# Patient Record
Sex: FEMALE | Race: BLACK OR AFRICAN AMERICAN | NOT HISPANIC OR LATINO | Employment: PART TIME | URBAN - METROPOLITAN AREA
[De-identification: names, ages, dates, MRNs, and addresses within clinical notes are randomized per-mention and may not be internally consistent; named-entity substitution may affect disease eponyms.]

---

## 2017-11-10 ENCOUNTER — HOSPITAL ENCOUNTER (EMERGENCY)
Facility: HOSPITAL | Age: 27
Discharge: HOME | End: 2017-11-10
Attending: EMERGENCY MEDICINE
Payer: MEDICAID

## 2017-11-10 VITALS
HEIGHT: 66 IN | SYSTOLIC BLOOD PRESSURE: 144 MMHG | DIASTOLIC BLOOD PRESSURE: 80 MMHG | HEART RATE: 90 BPM | RESPIRATION RATE: 18 BRPM | TEMPERATURE: 98 F | OXYGEN SATURATION: 99 % | BODY MASS INDEX: 27.32 KG/M2 | WEIGHT: 170 LBS

## 2017-11-10 DIAGNOSIS — J06.9 VIRAL URI WITH COUGH: Primary | ICD-10-CM

## 2017-11-10 LAB
B-HCG UR QL: NEGATIVE
CTP QC/QA: YES
FLUAV AG SPEC QL IA: NEGATIVE
FLUBV AG SPEC QL IA: NEGATIVE
SPECIMEN SOURCE: NORMAL

## 2017-11-10 PROCEDURE — 81025 URINE PREGNANCY TEST: CPT | Performed by: EMERGENCY MEDICINE

## 2017-11-10 PROCEDURE — 25000003 PHARM REV CODE 250: Performed by: PHYSICIAN ASSISTANT

## 2017-11-10 PROCEDURE — 87400 INFLUENZA A/B EACH AG IA: CPT | Mod: 59

## 2017-11-10 PROCEDURE — 99284 EMERGENCY DEPT VISIT MOD MDM: CPT | Mod: 25

## 2017-11-10 RX ORDER — FLUTICASONE PROPIONATE 50 MCG
1 SPRAY, SUSPENSION (ML) NASAL 2 TIMES DAILY PRN
Qty: 15 G | Refills: 0 | Status: SHIPPED | OUTPATIENT
Start: 2017-11-10 | End: 2019-05-20

## 2017-11-10 RX ORDER — CETIRIZINE HYDROCHLORIDE, PSEUDOEPHEDRINE HYDROCHLORIDE 5; 120 MG/1; MG/1
1 TABLET, FILM COATED, EXTENDED RELEASE ORAL DAILY
Qty: 30 TABLET | Refills: 0 | Status: SHIPPED | OUTPATIENT
Start: 2017-11-10 | End: 2017-11-20

## 2017-11-10 RX ORDER — KETOROLAC TROMETHAMINE 10 MG/1
10 TABLET, FILM COATED ORAL
Status: COMPLETED | OUTPATIENT
Start: 2017-11-10 | End: 2017-11-10

## 2017-11-10 RX ORDER — BENZONATATE 100 MG/1
100 CAPSULE ORAL 3 TIMES DAILY PRN
Status: DISCONTINUED | OUTPATIENT
Start: 2017-11-10 | End: 2017-11-10

## 2017-11-10 RX ORDER — GUAIFENESIN AND DEXTROMETHORPHAN HYDROBROMIDE 60; 1200 MG/1; MG/1
1 TABLET, EXTENDED RELEASE ORAL 2 TIMES DAILY PRN
Qty: 30 TABLET | Refills: 0 | Status: SHIPPED | OUTPATIENT
Start: 2017-11-10 | End: 2017-11-20

## 2017-11-10 RX ORDER — IBUPROFEN 600 MG/1
600 TABLET ORAL EVERY 6 HOURS PRN
Qty: 20 TABLET | Refills: 0 | Status: SHIPPED | OUTPATIENT
Start: 2017-11-10 | End: 2019-05-20

## 2017-11-10 RX ADMIN — BENZONATATE 100 MG: 100 CAPSULE ORAL at 02:11

## 2017-11-10 RX ADMIN — KETOROLAC TROMETHAMINE 10 MG: 10 TABLET, FILM COATED ORAL at 02:11

## 2017-11-10 NOTE — ED NOTES
Patient has verified the spelling of their name and  on armband  LOC: The patient is awake, alert, and aware of environment with an appropriate affect, the patient is oriented x 3 and speaking appropriately.   APPEARANCE: Patient resting comfortably and in no acute distress, patient is clean and well groomed, patient's clothing is properly fastened.   SKIN: The skin is warm and dry, color consistent with ethnicity, patient has normal skin turgor and moist mucus membranes, skin intact, no breakdown or bruising noted.   : Voids without difficulty  MUSCULOSKELETAL: Patient moving all extremities spontaneously, no obvious swelling or deformities noted, back pain, muscle aches.   RESPIRATORY: Airway is open and patent, respirations are spontaneous, patient has a normal effort and rate, no accessory muscle use noted, bilateral breath sounds clear, denies SOB   ABDOMEN: Soft and non tender to palpation, no distention noted, normoactive bowel sounds present in all four quadrants.   CARDIAC: Normal rate and rhythm, no peripheral edema noted, less then 3 second capillary refill, denies chest pain  COMPLAINT: cough, post nasal drip

## 2017-11-10 NOTE — ED PROVIDER NOTES
Encounter Date: 11/10/2017       History     Chief Complaint   Patient presents with    Influenza     bodyaches withsore throat and cough for 2 days.     Odalis Valdaez 27 y.o. afebrile female with no reported past medical history presented to the ED with c/o URI symptoms for the past 2 days.  She complains of rhinorrhea, nasal congestion, sore throat and dry cough.  She does report that she began with some chills and generalized body aches today.  She denies any difficulty swallowing, shortness of breath, productive cough, chest pain, nausea, vomiting, diarrhea, decreased urine output or rash.  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  She has tried DayQuil with little relief of symptoms.       The history is provided by the patient.     Review of patient's allergies indicates:  No Known Allergies  History reviewed. No pertinent past medical history.  History reviewed. No pertinent surgical history.  History reviewed. No pertinent family history.  Social History   Substance Use Topics    Smoking status: Never Smoker    Smokeless tobacco: Never Used    Alcohol use No     Review of Systems   Constitutional: Positive for appetite change and chills. Negative for fever.   HENT: Positive for congestion, postnasal drip, sinus pressure and sore throat. Negative for trouble swallowing and voice change.    Respiratory: Positive for cough (dry). Negative for shortness of breath and wheezing.    Cardiovascular: Negative for chest pain.   Gastrointestinal: Negative for nausea and vomiting.   Genitourinary: Negative for decreased urine volume and dysuria.   Musculoskeletal: Positive for arthralgias (body aches). Negative for back pain, neck pain and neck stiffness.   Skin: Negative for rash.   Neurological: Negative for dizziness, weakness, light-headedness and headaches.   Hematological: Does not bruise/bleed easily.       Physical Exam      Initial Vitals [11/10/17 1345]   BP Pulse Resp Temp SpO2   (!) 144/80 90 18 98.4 °F (36.9 °C) 99 %      MAP       101.33         Physical Exam    Nursing note and vitals reviewed.  Constitutional: Vital signs are normal. She appears well-developed and well-nourished. She is cooperative.  Non-toxic appearance. She does not appear ill. No distress.   HENT:   Head: Normocephalic and atraumatic.   Nose: Mucosal edema and rhinorrhea present.   Mouth/Throat: No oropharyngeal exudate or posterior oropharyngeal edema.   Eyes: Conjunctivae and lids are normal.   Neck: Neck supple. No neck rigidity.   Cardiovascular: Normal rate and regular rhythm.   Pulmonary/Chest: Breath sounds normal. No respiratory distress. She has no wheezes. She has no rhonchi.   Abdominal: Soft. Normal appearance and bowel sounds are normal. There is no tenderness. There is no rigidity and no guarding.   Musculoskeletal: Normal range of motion.   Neurological: She is alert and oriented to person, place, and time. GCS eye subscore is 4. GCS verbal subscore is 5. GCS motor subscore is 6.   Skin: Skin is warm, dry and intact. No rash noted.   Psychiatric: She has a normal mood and affect. Her speech is normal and behavior is normal. Thought content normal.         ED Course   Procedures  Labs Reviewed   INFLUENZA A AND B ANTIGEN   POCT URINE PREGNANCY   Odalis Rory 27 y.o. afebrile female with no reported past medical history presented to the ED with c/o URI symptoms for the past 2 days.  She complains of rhinorrhea, nasal congestion, sore throat and dry cough.  She does report that she began with some chills and generalized body aches today.  She denies any difficulty swallowing, shortness of breath, productive cough, chest pain, nausea, vomiting, diarrhea, decreased urine output or rash.  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  She has tried DayQuil with little  relief of symptoms.  ROS positive for URI symptoms.  Physical exam reveals patient that appears ill but nontoxic. TM's with bilateral serous otitis media; nose with congestion and rhinorrhea. Oropharynx with mild erythema; no edema or exudate. Lungs clear and free of wheeze. Heart regular rate and rhythm. Abdomen is soft and nontender with normal bowel sounds. FROM of neck, no lymphadenopathy and FROM of all extremities with strength 5/5 bilaterally. Skin free of rash, pallor and diaphoresis.    DDX: influenza, viral URI with cough, bronchitis, pneumonia    ED management: Flu swab negative.  No imaging or lab work warranted at this time as patient is typically well;  afebrile female with no significant clinical exam findings.  Symptoms reduced with Toradol.  We will send home with supportive medications for probable viral URI and informed patient this is likely self-limiting. Instructed patient on fever and symptom control.      Impression/Plan: The encounter diagnosis was Viral URI with cough. Discharged with Zyrtec-D, Mucinex DM, Flonase and Motrin. Patient will follow up with Primary.  Patient cautioned on when to return to ED.  Pt. Understands and agrees with current treatment plan                                                   ED Course      Clinical Impression:   The encounter diagnosis was Viral URI with cough.                           BYRON Waggoner  11/10/17 1060

## 2019-05-20 ENCOUNTER — HOSPITAL ENCOUNTER (EMERGENCY)
Facility: HOSPITAL | Age: 29
Discharge: HOME | End: 2019-05-20
Attending: EMERGENCY MEDICINE
Payer: MEDICAID

## 2019-05-20 VITALS
TEMPERATURE: 99 F | HEART RATE: 98 BPM | OXYGEN SATURATION: 100 % | WEIGHT: 245 LBS | RESPIRATION RATE: 16 BRPM | SYSTOLIC BLOOD PRESSURE: 135 MMHG | HEIGHT: 68 IN | DIASTOLIC BLOOD PRESSURE: 83 MMHG | BODY MASS INDEX: 37.13 KG/M2

## 2019-05-20 DIAGNOSIS — R06.00 DYSPNEA, UNSPECIFIED TYPE: Primary | ICD-10-CM

## 2019-05-20 PROCEDURE — 93010 EKG 12-LEAD: ICD-10-PCS | Mod: ,,, | Performed by: INTERNAL MEDICINE

## 2019-05-20 PROCEDURE — 93005 ELECTROCARDIOGRAM TRACING: CPT

## 2019-05-20 PROCEDURE — 99282 EMERGENCY DEPT VISIT SF MDM: CPT | Mod: 25

## 2019-05-20 PROCEDURE — 93010 ELECTROCARDIOGRAM REPORT: CPT | Mod: ,,, | Performed by: INTERNAL MEDICINE

## 2019-05-20 RX ORDER — SERTRALINE HYDROCHLORIDE 50 MG/1
50 TABLET, FILM COATED ORAL DAILY
COMMUNITY

## 2019-05-20 NOTE — ED PROVIDER NOTES
Encounter Date: 5/20/2019    SCRIBE #1 NOTE: I, Meagan Gramajo, am scribing for, and in the presence of,  Dr. Sawyer. I have scribed the entire note.       History     Chief Complaint   Patient presents with    Shortness of Breath     Pt states she was walking and suddenly felt pulling sensation to left side of face with SOB, SOB has subsided. Pt deneis chest pain.      Time seen by provider: 3:34 PM    This is a 28 y.o. female who presents with complaint of shortness of breath for 60 seconds. She denies any chest pain, tingling sensation, fever, cough, or congestion. Patient reports she was walking when she felt a sudden pulling sensation to the left side of her face when shortness of breath began. She was placed  on Zoloft 50mg two weeks ago. She has no medical history.      The history is provided by the patient.     Review of patient's allergies indicates:  No Known Allergies  History reviewed. No pertinent past medical history.  No past surgical history on file.  No family history on file.  Social History     Tobacco Use    Smoking status: Never Smoker    Smokeless tobacco: Never Used   Substance Use Topics    Alcohol use: No    Drug use: No     Review of Systems   Respiratory: Positive for shortness of breath.    All other systems reviewed and are negative.      Physical Exam     Initial Vitals [05/20/19 1437]   BP Pulse Resp Temp SpO2   135/83 98 16 98.5 °F (36.9 °C) 100 %      MAP       --         Physical Exam    Nursing note and vitals reviewed.  Constitutional: She appears well-developed and well-nourished. She is not diaphoretic. No distress.   HENT:   Head: Normocephalic and atraumatic.   Mouth/Throat: Oropharynx is clear and moist.   Eyes: EOM are normal. Pupils are equal, round, and reactive to light.   Neck: No tracheal deviation present.   Cardiovascular: Normal rate, regular rhythm, normal heart sounds and intact distal pulses.   No murmur heard.  Pulmonary/Chest: Breath sounds normal. No  stridor. No respiratory distress. She has no wheezes.   Abdominal: Soft. Bowel sounds are normal. She exhibits no distension. There is no tenderness.   Musculoskeletal: Normal range of motion.   No lower extremity edema.   Neurological: She is alert and oriented to person, place, and time. She has normal strength. No cranial nerve deficit or sensory deficit.   Skin: Skin is warm and dry. Capillary refill takes less than 2 seconds. No pallor.   Psychiatric: She has a normal mood and affect. Her behavior is normal. Thought content normal.         ED Course   Procedures  Labs Reviewed - No data to display          Medical Decision Making:   ED Management:  28-year-old female who experienced a brief, approximately 60 sec and pulling sensation to the left side of her face and says she felt short of breath. Symptoms have not returned since initial onset.  She has a normal EKG and a normal physical exam.  Vital signs are normal and I do not see the current need for any further testing.  Patient may have had a facial muscle spasm.  She will follow up as needed but also return here for any recurring shortness of breath or any other concerns.                      Clinical Impression:       ICD-10-CM ICD-9-CM   1. Dyspnea, unspecified type R06.00 786.09            I, Dr. Sergei Sawyer, personally performed the services described in this documentation. All medical record entries made by the scribe were at my direction and in my presence. I have reviewed the chart and agree that the record reflects my personal performance and is accurate and complete. Sergei Sawyer MD.  6:10 PM 05/20/2019                   Sergei Saywer MD  05/20/19 5245

## 2019-05-24 DIAGNOSIS — R06.00 DYSPNEA, UNSPECIFIED TYPE: Primary | ICD-10-CM

## 2020-07-31 ENCOUNTER — LAB VISIT (OUTPATIENT)
Dept: LAB | Facility: OTHER | Age: 30
End: 2020-07-31
Payer: MEDICAID

## 2020-07-31 DIAGNOSIS — Z20.822 SUSPECTED COVID-19 VIRUS INFECTION: ICD-10-CM

## 2020-07-31 DIAGNOSIS — Z03.818 ENCOUNTER FOR OBSERVATION FOR SUSPECTED EXPOSURE TO OTHER BIOLOGICAL AGENTS RULED OUT: ICD-10-CM

## 2020-07-31 PROCEDURE — U0003 INFECTIOUS AGENT DETECTION BY NUCLEIC ACID (DNA OR RNA); SEVERE ACUTE RESPIRATORY SYNDROME CORONAVIRUS 2 (SARS-COV-2) (CORONAVIRUS DISEASE [COVID-19]), AMPLIFIED PROBE TECHNIQUE, MAKING USE OF HIGH THROUGHPUT TECHNOLOGIES AS DESCRIBED BY CMS-2020-01-R: HCPCS

## 2020-08-02 LAB — SARS-COV-2 RNA RESP QL NAA+PROBE: NOT DETECTED

## 2020-12-04 DIAGNOSIS — Z01.84 ANTIBODY RESPONSE EXAMINATION: ICD-10-CM

## 2020-12-30 ENCOUNTER — HOSPITAL ENCOUNTER (EMERGENCY)
Facility: HOSPITAL | Age: 30
Discharge: HOME | End: 2020-12-30
Attending: EMERGENCY MEDICINE
Payer: MEDICAID

## 2020-12-30 VITALS
SYSTOLIC BLOOD PRESSURE: 138 MMHG | WEIGHT: 267 LBS | OXYGEN SATURATION: 100 % | HEIGHT: 67 IN | RESPIRATION RATE: 16 BRPM | DIASTOLIC BLOOD PRESSURE: 77 MMHG | HEART RATE: 66 BPM | TEMPERATURE: 99 F | BODY MASS INDEX: 41.91 KG/M2

## 2020-12-30 DIAGNOSIS — Z20.822 COVID-19 VIRUS NOT DETECTED: Primary | ICD-10-CM

## 2020-12-30 DIAGNOSIS — J02.9 VIRAL PHARYNGITIS: ICD-10-CM

## 2020-12-30 DIAGNOSIS — B34.9 VIRAL SYNDROME: ICD-10-CM

## 2020-12-30 DIAGNOSIS — Z20.822 EXPOSURE TO COVID-19 VIRUS: ICD-10-CM

## 2020-12-30 LAB
CTP QC/QA: YES
SARS-COV-2 RDRP RESP QL NAA+PROBE: NEGATIVE

## 2020-12-30 PROCEDURE — U0002 COVID-19 LAB TEST NON-CDC: HCPCS | Performed by: PHYSICIAN ASSISTANT

## 2020-12-30 PROCEDURE — 99282 EMERGENCY DEPT VISIT SF MDM: CPT | Mod: 25

## 2021-01-10 ENCOUNTER — HOSPITAL ENCOUNTER (EMERGENCY)
Facility: HOSPITAL | Age: 31
Discharge: HOME | End: 2021-01-10
Attending: EMERGENCY MEDICINE
Payer: MEDICAID

## 2021-01-10 VITALS
OXYGEN SATURATION: 97 % | DIASTOLIC BLOOD PRESSURE: 85 MMHG | BODY MASS INDEX: 41.91 KG/M2 | WEIGHT: 267 LBS | TEMPERATURE: 99 F | SYSTOLIC BLOOD PRESSURE: 140 MMHG | HEIGHT: 67 IN | RESPIRATION RATE: 18 BRPM | HEART RATE: 86 BPM

## 2021-01-10 DIAGNOSIS — Z20.822 COVID-19 VIRUS NOT DETECTED: Primary | ICD-10-CM

## 2021-01-10 LAB
CTP QC/QA: YES
SARS-COV-2 RDRP RESP QL NAA+PROBE: NEGATIVE

## 2021-01-10 PROCEDURE — 99282 EMERGENCY DEPT VISIT SF MDM: CPT

## 2021-01-10 PROCEDURE — U0002 COVID-19 LAB TEST NON-CDC: HCPCS | Performed by: EMERGENCY MEDICINE

## 2021-03-29 ENCOUNTER — OFFICE VISIT (OUTPATIENT)
Dept: URGENT CARE | Facility: CLINIC | Age: 31
End: 2021-03-29
Payer: COMMERCIAL

## 2021-03-29 VITALS
BODY MASS INDEX: 39.4 KG/M2 | DIASTOLIC BLOOD PRESSURE: 73 MMHG | WEIGHT: 260 LBS | HEIGHT: 68 IN | TEMPERATURE: 98 F | SYSTOLIC BLOOD PRESSURE: 126 MMHG | RESPIRATION RATE: 16 BRPM | HEART RATE: 99 BPM

## 2021-03-29 DIAGNOSIS — Z02.83 ENCOUNTER FOR DRUG SCREENING: ICD-10-CM

## 2021-03-29 DIAGNOSIS — M62.830 BACK SPASM: ICD-10-CM
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
## 2021-03-29 DIAGNOSIS — M54.50 LOW BACK PAIN, UNSPECIFIED BACK PAIN LATERALITY, UNSPECIFIED CHRONICITY, UNSPECIFIED WHETHER SCIATICA PRESENT: ICD-10-CM

## 2021-03-29 DIAGNOSIS — S39.012A STRAIN OF LUMBAR REGION, INITIAL ENCOUNTER: Primary | ICD-10-CM

## 2021-03-29 LAB
CTP QC/QA: YES
POC 10 PANEL DRUG SCREEN: NEGATIVE

## 2021-03-29 PROCEDURE — 72110 XR LUMBAR SPINE COMPLETE 5 VIEW: ICD-10-PCS | Mod: FY,S$GLB,, | Performed by: RADIOLOGY

## 2021-03-29 PROCEDURE — 80305 DRUG TEST PRSMV DIR OPT OBS: CPT | Mod: QW,S$GLB,, | Performed by: NURSE PRACTITIONER

## 2021-03-29 PROCEDURE — 99204 PR OFFICE/OUTPT VISIT, NEW, LEVL IV, 45-59 MIN: ICD-10-PCS | Mod: S$GLB,,, | Performed by: NURSE PRACTITIONER

## 2021-03-29 PROCEDURE — 72110 X-RAY EXAM L-2 SPINE 4/>VWS: CPT | Mod: FY,S$GLB,, | Performed by: RADIOLOGY

## 2021-03-29 PROCEDURE — 80305 POCT RAPID DRUG SCREEN 10 PANEL: ICD-10-PCS | Mod: QW,S$GLB,, | Performed by: NURSE PRACTITIONER

## 2021-03-29 PROCEDURE — 99204 OFFICE O/P NEW MOD 45 MIN: CPT | Mod: S$GLB,,, | Performed by: NURSE PRACTITIONER

## 2021-03-29 RX ORDER — NAPROXEN 500 MG/1
500 TABLET ORAL 2 TIMES DAILY WITH MEALS
Qty: 30 TABLET | Refills: 0 | Status: SHIPPED | OUTPATIENT
Start: 2021-03-29 | End: 2021-04-19 | Stop reason: SDUPTHER

## 2021-03-29 RX ORDER — TIZANIDINE 4 MG/1
4 TABLET ORAL NIGHTLY PRN
Qty: 30 TABLET | Refills: 0 | Status: SHIPPED | OUTPATIENT
Start: 2021-03-29 | End: 2021-04-19 | Stop reason: SDUPTHER

## 2021-04-07 ENCOUNTER — CLINICAL SUPPORT (OUTPATIENT)
Dept: REHABILITATION | Facility: HOSPITAL | Age: 31
End: 2021-04-07
Payer: COMMERCIAL

## 2021-04-07 DIAGNOSIS — M54.50 ACUTE BILATERAL LOW BACK PAIN WITHOUT SCIATICA: ICD-10-CM

## 2021-04-07 DIAGNOSIS — M25.552 LEFT HIP PAIN: ICD-10-CM

## 2021-04-07 PROCEDURE — 97110 THERAPEUTIC EXERCISES: CPT | Mod: PO

## 2021-04-07 PROCEDURE — 97161 PT EVAL LOW COMPLEX 20 MIN: CPT | Mod: PO

## 2021-04-08 ENCOUNTER — OFFICE VISIT (OUTPATIENT)
Dept: URGENT CARE | Facility: CLINIC | Age: 31
End: 2021-04-08
Payer: COMMERCIAL
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
## 2021-04-08 DIAGNOSIS — M62.830 BACK SPASM: ICD-10-CM

## 2021-04-08 DIAGNOSIS — M54.50 LOW BACK PAIN, UNSPECIFIED BACK PAIN LATERALITY, UNSPECIFIED CHRONICITY, UNSPECIFIED WHETHER SCIATICA PRESENT: ICD-10-CM

## 2021-04-08 DIAGNOSIS — S39.012D STRAIN OF LUMBAR REGION, SUBSEQUENT ENCOUNTER: Primary | ICD-10-CM

## 2021-04-08 PROBLEM — M25.552 LEFT HIP PAIN: Status: ACTIVE | Noted: 2021-04-08

## 2021-04-08 PROCEDURE — 99214 OFFICE O/P EST MOD 30 MIN: CPT | Mod: S$GLB,,, | Performed by: NURSE PRACTITIONER

## 2021-04-08 PROCEDURE — 99214 PR OFFICE/OUTPT VISIT, EST, LEVL IV, 30-39 MIN: ICD-10-PCS | Mod: S$GLB,,, | Performed by: NURSE PRACTITIONER

## 2021-04-19 ENCOUNTER — OFFICE VISIT (OUTPATIENT)
Dept: URGENT CARE | Facility: CLINIC | Age: 31
End: 2021-04-19
Payer: COMMERCIAL

## 2021-04-19 ENCOUNTER — TELEPHONE (OUTPATIENT)
Dept: REHABILITATION | Facility: HOSPITAL | Age: 31
End: 2021-04-19

## 2021-04-19 DIAGNOSIS — M62.830 BACK SPASM: ICD-10-CM

## 2021-04-19 DIAGNOSIS — S39.012D STRAIN OF LUMBAR REGION, SUBSEQUENT ENCOUNTER: Primary | ICD-10-CM

## 2021-04-19 DIAGNOSIS — M54.50 LOW BACK PAIN, UNSPECIFIED BACK PAIN LATERALITY, UNSPECIFIED CHRONICITY, UNSPECIFIED WHETHER SCIATICA PRESENT: ICD-10-CM

## 2021-04-19 DIAGNOSIS — S39.012A STRAIN OF LUMBAR REGION, INITIAL ENCOUNTER: ICD-10-CM

## 2021-04-19 PROCEDURE — 99214 OFFICE O/P EST MOD 30 MIN: CPT | Mod: S$GLB,,, | Performed by: PREVENTIVE MEDICINE

## 2021-04-19 PROCEDURE — 99214 PR OFFICE/OUTPT VISIT, EST, LEVL IV, 30-39 MIN: ICD-10-PCS | Mod: S$GLB,,, | Performed by: PREVENTIVE MEDICINE

## 2021-04-19 RX ORDER — NAPROXEN 500 MG/1
500 TABLET ORAL 2 TIMES DAILY WITH MEALS
Qty: 30 TABLET | Refills: 0 | Status: SHIPPED | OUTPATIENT
Start: 2021-04-19 | End: 2022-11-01

## 2021-04-19 RX ORDER — TIZANIDINE 4 MG/1
4 TABLET ORAL NIGHTLY PRN
Qty: 30 TABLET | Refills: 0 | Status: SHIPPED | OUTPATIENT
Start: 2021-04-19

## 2021-04-20 ENCOUNTER — CLINICAL SUPPORT (OUTPATIENT)
Dept: REHABILITATION | Facility: HOSPITAL | Age: 31
End: 2021-04-20
Payer: COMMERCIAL

## 2021-04-20 DIAGNOSIS — M54.50 ACUTE BILATERAL LOW BACK PAIN WITHOUT SCIATICA: ICD-10-CM

## 2021-04-20 DIAGNOSIS — M25.552 LEFT HIP PAIN: ICD-10-CM

## 2021-04-20 PROCEDURE — 97110 THERAPEUTIC EXERCISES: CPT | Mod: PO

## 2021-04-26 ENCOUNTER — PATIENT MESSAGE (OUTPATIENT)
Dept: RESEARCH | Facility: HOSPITAL | Age: 31
End: 2021-04-26

## 2021-04-28 ENCOUNTER — CLINICAL SUPPORT (OUTPATIENT)
Dept: REHABILITATION | Facility: HOSPITAL | Age: 31
End: 2021-04-28
Payer: COMMERCIAL

## 2021-04-28 DIAGNOSIS — M25.552 LEFT HIP PAIN: ICD-10-CM

## 2021-04-28 DIAGNOSIS — M54.50 ACUTE BILATERAL LOW BACK PAIN WITHOUT SCIATICA: ICD-10-CM

## 2021-04-28 PROCEDURE — 97110 THERAPEUTIC EXERCISES: CPT | Mod: PO,CQ

## 2021-05-03 ENCOUNTER — OFFICE VISIT (OUTPATIENT)
Dept: URGENT CARE | Facility: CLINIC | Age: 31
End: 2021-05-03
Payer: COMMERCIAL

## 2021-05-03 DIAGNOSIS — M54.50 LOW BACK PAIN, UNSPECIFIED BACK PAIN LATERALITY, UNSPECIFIED CHRONICITY, UNSPECIFIED WHETHER SCIATICA PRESENT: ICD-10-CM
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
## 2021-05-03 DIAGNOSIS — S39.012D STRAIN OF LUMBAR REGION, SUBSEQUENT ENCOUNTER: Primary | ICD-10-CM

## 2021-05-03 DIAGNOSIS — M62.830 BACK SPASM: ICD-10-CM

## 2021-05-03 PROCEDURE — 99214 PR OFFICE/OUTPT VISIT, EST, LEVL IV, 30-39 MIN: ICD-10-PCS | Mod: S$GLB,,, | Performed by: NURSE PRACTITIONER

## 2021-05-03 PROCEDURE — 99214 OFFICE O/P EST MOD 30 MIN: CPT | Mod: S$GLB,,, | Performed by: NURSE PRACTITIONER

## 2021-05-04 ENCOUNTER — DOCUMENTATION ONLY (OUTPATIENT)
Dept: REHABILITATION | Facility: HOSPITAL | Age: 31
End: 2021-05-04

## 2021-05-12 ENCOUNTER — CLINICAL SUPPORT (OUTPATIENT)
Dept: REHABILITATION | Facility: HOSPITAL | Age: 31
End: 2021-05-12
Payer: MEDICAID

## 2021-05-12 DIAGNOSIS — M25.552 LEFT HIP PAIN: ICD-10-CM

## 2021-05-12 DIAGNOSIS — M54.50 ACUTE BILATERAL LOW BACK PAIN WITHOUT SCIATICA: ICD-10-CM

## 2021-05-12 PROCEDURE — 97110 THERAPEUTIC EXERCISES: CPT | Mod: PO,CQ

## 2021-05-15 ENCOUNTER — HOSPITAL ENCOUNTER (EMERGENCY)
Facility: HOSPITAL | Age: 31
Discharge: HOME | End: 2021-05-15
Attending: EMERGENCY MEDICINE
Payer: MEDICAID

## 2021-05-15 VITALS
HEIGHT: 67 IN | TEMPERATURE: 99 F | BODY MASS INDEX: 38.45 KG/M2 | WEIGHT: 245 LBS | SYSTOLIC BLOOD PRESSURE: 137 MMHG | OXYGEN SATURATION: 99 % | RESPIRATION RATE: 16 BRPM | DIASTOLIC BLOOD PRESSURE: 76 MMHG | HEART RATE: 110 BPM

## 2021-05-15 DIAGNOSIS — S39.012A STRAIN OF LUMBAR REGION, INITIAL ENCOUNTER: Primary | ICD-10-CM

## 2021-05-15 LAB
B-HCG UR QL: NEGATIVE
CTP QC/QA: YES

## 2021-05-15 PROCEDURE — 25000003 PHARM REV CODE 250: Performed by: EMERGENCY MEDICINE

## 2021-05-15 PROCEDURE — 99284 EMERGENCY DEPT VISIT MOD MDM: CPT | Mod: 25

## 2021-05-15 PROCEDURE — 96372 THER/PROPH/DIAG INJ SC/IM: CPT

## 2021-05-15 PROCEDURE — 63600175 PHARM REV CODE 636 W HCPCS: Performed by: PHYSICIAN ASSISTANT

## 2021-05-15 PROCEDURE — 81025 URINE PREGNANCY TEST: CPT | Performed by: PHYSICIAN ASSISTANT

## 2021-05-15 RX ORDER — LIDOCAINE 50 MG/G
2 PATCH TOPICAL ONCE
Status: DISCONTINUED | OUTPATIENT
Start: 2021-05-15 | End: 2021-05-15 | Stop reason: HOSPADM

## 2021-05-15 RX ORDER — LIDOCAINE 50 MG/G
1 PATCH TOPICAL DAILY
Qty: 15 PATCH | Refills: 0 | Status: SHIPPED | OUTPATIENT
Start: 2021-05-15

## 2021-05-15 RX ORDER — TRIAMCINOLONE ACETONIDE 40 MG/ML
40 INJECTION, SUSPENSION INTRA-ARTICULAR; INTRAMUSCULAR
Status: COMPLETED | OUTPATIENT
Start: 2021-05-15 | End: 2021-05-15

## 2021-05-15 RX ORDER — LIDOCAINE 50 MG/G
2 PATCH TOPICAL ONCE
Status: DISCONTINUED | OUTPATIENT
Start: 2021-05-15 | End: 2021-05-15

## 2021-05-15 RX ORDER — LIDOCAINE 50 MG/G
2 PATCH TOPICAL
Status: DISCONTINUED | OUTPATIENT
Start: 2021-05-15 | End: 2021-05-15

## 2021-05-15 RX ORDER — KETOROLAC TROMETHAMINE 30 MG/ML
30 INJECTION, SOLUTION INTRAMUSCULAR; INTRAVENOUS
Status: COMPLETED | OUTPATIENT
Start: 2021-05-15 | End: 2021-05-15

## 2021-05-15 RX ADMIN — LIDOCAINE 2 PATCH: 50 PATCH TOPICAL at 05:05

## 2021-05-15 RX ADMIN — KETOROLAC TROMETHAMINE 30 MG: 30 INJECTION, SOLUTION INTRAMUSCULAR; INTRAVENOUS at 05:05

## 2021-05-15 RX ADMIN — TRIAMCINOLONE ACETONIDE 40 MG: 40 INJECTION, SUSPENSION INTRA-ARTICULAR; INTRAMUSCULAR at 05:05

## 2021-05-17 ENCOUNTER — OFFICE VISIT (OUTPATIENT)
Dept: URGENT CARE | Facility: CLINIC | Age: 31
End: 2021-05-17
Payer: COMMERCIAL

## 2021-05-17 DIAGNOSIS — M54.50 LOW BACK PAIN, UNSPECIFIED BACK PAIN LATERALITY, UNSPECIFIED CHRONICITY, UNSPECIFIED WHETHER SCIATICA PRESENT: ICD-10-CM

## 2021-05-17 DIAGNOSIS — S39.012D STRAIN OF LUMBAR REGION, SUBSEQUENT ENCOUNTER: Primary | ICD-10-CM

## 2021-05-17 DIAGNOSIS — M62.830 BACK SPASM: ICD-10-CM
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
## 2021-05-17 PROCEDURE — 99213 OFFICE O/P EST LOW 20 MIN: CPT | Mod: S$GLB,,, | Performed by: NURSE PRACTITIONER

## 2021-05-17 PROCEDURE — 99213 PR OFFICE/OUTPT VISIT, EST, LEVL III, 20-29 MIN: ICD-10-PCS | Mod: S$GLB,,, | Performed by: NURSE PRACTITIONER

## 2021-05-18 ENCOUNTER — CLINICAL SUPPORT (OUTPATIENT)
Dept: REHABILITATION | Facility: HOSPITAL | Age: 31
End: 2021-05-18
Payer: MEDICAID

## 2021-05-18 DIAGNOSIS — M25.552 LEFT HIP PAIN: ICD-10-CM

## 2021-05-18 DIAGNOSIS — M54.50 ACUTE BILATERAL LOW BACK PAIN WITHOUT SCIATICA: ICD-10-CM

## 2021-05-18 PROCEDURE — 97110 THERAPEUTIC EXERCISES: CPT | Mod: PO

## 2021-05-20 ENCOUNTER — CLINICAL SUPPORT (OUTPATIENT)
Dept: REHABILITATION | Facility: HOSPITAL | Age: 31
End: 2021-05-20
Payer: MEDICAID

## 2021-05-20 DIAGNOSIS — M25.552 LEFT HIP PAIN: ICD-10-CM

## 2021-05-20 DIAGNOSIS — M54.50 ACUTE BILATERAL LOW BACK PAIN WITHOUT SCIATICA: ICD-10-CM

## 2021-05-20 PROCEDURE — 97110 THERAPEUTIC EXERCISES: CPT | Mod: PO

## 2021-05-20 PROCEDURE — 97750 PHYSICAL PERFORMANCE TEST: CPT | Mod: PO

## 2021-05-25 ENCOUNTER — CLINICAL SUPPORT (OUTPATIENT)
Dept: REHABILITATION | Facility: HOSPITAL | Age: 31
End: 2021-05-25
Payer: MEDICAID

## 2021-05-25 DIAGNOSIS — M25.552 LEFT HIP PAIN: ICD-10-CM

## 2021-05-25 DIAGNOSIS — M54.50 ACUTE BILATERAL LOW BACK PAIN WITHOUT SCIATICA: ICD-10-CM

## 2021-05-25 PROCEDURE — 97110 THERAPEUTIC EXERCISES: CPT | Mod: PO

## 2021-05-26 ENCOUNTER — OFFICE VISIT (OUTPATIENT)
Dept: URGENT CARE | Facility: CLINIC | Age: 31
End: 2021-05-26
Payer: COMMERCIAL

## 2021-05-26 DIAGNOSIS — S39.012D STRAIN OF LUMBAR REGION, SUBSEQUENT ENCOUNTER: Primary | ICD-10-CM

## 2021-05-26 DIAGNOSIS — M54.50 LOW BACK PAIN, UNSPECIFIED BACK PAIN LATERALITY, UNSPECIFIED CHRONICITY, UNSPECIFIED WHETHER SCIATICA PRESENT: ICD-10-CM
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
## 2021-05-26 PROCEDURE — 99213 PR OFFICE/OUTPT VISIT, EST, LEVL III, 20-29 MIN: ICD-10-PCS | Mod: S$GLB,,, | Performed by: NURSE PRACTITIONER

## 2021-05-26 PROCEDURE — 99213 OFFICE O/P EST LOW 20 MIN: CPT | Mod: S$GLB,,, | Performed by: NURSE PRACTITIONER

## 2021-06-01 ENCOUNTER — CLINICAL SUPPORT (OUTPATIENT)
Dept: REHABILITATION | Facility: HOSPITAL | Age: 31
End: 2021-06-01
Payer: MEDICAID

## 2021-06-01 DIAGNOSIS — M25.552 LEFT HIP PAIN: ICD-10-CM

## 2021-06-01 DIAGNOSIS — M54.50 ACUTE BILATERAL LOW BACK PAIN WITHOUT SCIATICA: ICD-10-CM

## 2021-06-01 PROCEDURE — 97530 THERAPEUTIC ACTIVITIES: CPT | Mod: PO

## 2021-06-03 ENCOUNTER — OFFICE VISIT (OUTPATIENT)
Dept: URGENT CARE | Facility: CLINIC | Age: 31
End: 2021-06-03
Payer: COMMERCIAL

## 2021-06-03 ENCOUNTER — OCCUPATIONAL HEALTH (OUTPATIENT)
Dept: URGENT CARE | Facility: CLINIC | Age: 31
End: 2021-06-03

## 2021-06-03 DIAGNOSIS — Z02.1 ENCOUNTER FOR PRE-EMPLOYMENT EXAMINATION: Primary | ICD-10-CM

## 2021-06-03 DIAGNOSIS — Z02.1 PRE-EMPLOYMENT EXAMINATION: ICD-10-CM

## 2021-06-03 DIAGNOSIS — S39.012D STRAIN OF LUMBAR REGION, SUBSEQUENT ENCOUNTER: Primary | ICD-10-CM

## 2021-06-03 DIAGNOSIS — M54.50 LOW BACK PAIN, UNSPECIFIED BACK PAIN LATERALITY, UNSPECIFIED CHRONICITY, UNSPECIFIED WHETHER SCIATICA PRESENT: ICD-10-CM
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
## 2021-06-03 LAB
CTP QC/QA: YES
POC 10 PANEL DRUG SCREEN: NEGATIVE

## 2021-06-03 PROCEDURE — 99499 PHYSICAL, BASIC COMPLEXITY: ICD-10-PCS | Mod: S$GLB,,, | Performed by: EMERGENCY MEDICINE

## 2021-06-03 PROCEDURE — 80305 POCT RAPID DRUG SCREEN 10 PANEL: ICD-10-PCS | Mod: S$GLB,,, | Performed by: EMERGENCY MEDICINE

## 2021-06-03 PROCEDURE — 99213 PR OFFICE/OUTPT VISIT, EST, LEVL III, 20-29 MIN: ICD-10-PCS | Mod: S$GLB,,, | Performed by: NURSE PRACTITIONER

## 2021-06-03 PROCEDURE — 99499 UNLISTED E&M SERVICE: CPT | Mod: S$GLB,,, | Performed by: EMERGENCY MEDICINE

## 2021-06-03 PROCEDURE — 80305 DRUG TEST PRSMV DIR OPT OBS: CPT | Mod: S$GLB,,, | Performed by: EMERGENCY MEDICINE

## 2021-06-03 PROCEDURE — 99213 OFFICE O/P EST LOW 20 MIN: CPT | Mod: S$GLB,,, | Performed by: NURSE PRACTITIONER

## 2021-12-19 ENCOUNTER — HOSPITAL ENCOUNTER (EMERGENCY)
Facility: HOSPITAL | Age: 31
Discharge: HOME | End: 2021-12-19
Attending: EMERGENCY MEDICINE
Payer: MEDICAID

## 2021-12-19 VITALS
WEIGHT: 265 LBS | HEART RATE: 87 BPM | RESPIRATION RATE: 18 BRPM | BODY MASS INDEX: 41.5 KG/M2 | DIASTOLIC BLOOD PRESSURE: 76 MMHG | OXYGEN SATURATION: 100 % | SYSTOLIC BLOOD PRESSURE: 120 MMHG | TEMPERATURE: 99 F

## 2021-12-19 DIAGNOSIS — U07.1 COVID-19: Primary | ICD-10-CM

## 2021-12-19 LAB
B-HCG UR QL: NEGATIVE
CTP QC/QA: YES
CTP QC/QA: YES
SARS-COV-2 RDRP RESP QL NAA+PROBE: POSITIVE

## 2021-12-19 PROCEDURE — 81025 URINE PREGNANCY TEST: CPT | Performed by: PHYSICIAN ASSISTANT

## 2021-12-19 PROCEDURE — M0243 CASIRIVI AND IMDEVI INFUSION: HCPCS | Performed by: PHYSICIAN ASSISTANT

## 2021-12-19 PROCEDURE — 63600175 PHARM REV CODE 636 W HCPCS: Performed by: PHYSICIAN ASSISTANT

## 2021-12-19 PROCEDURE — 99284 EMERGENCY DEPT VISIT MOD MDM: CPT | Mod: 25

## 2021-12-19 PROCEDURE — 25000003 PHARM REV CODE 250: Performed by: PHYSICIAN ASSISTANT

## 2021-12-19 PROCEDURE — U0002 COVID-19 LAB TEST NON-CDC: HCPCS | Performed by: EMERGENCY MEDICINE

## 2021-12-19 PROCEDURE — 25000003 PHARM REV CODE 250: Performed by: EMERGENCY MEDICINE

## 2021-12-19 RX ORDER — ONDANSETRON 4 MG/1
4 TABLET, ORALLY DISINTEGRATING ORAL ONCE AS NEEDED
Status: DISCONTINUED | OUTPATIENT
Start: 2021-12-19 | End: 2021-12-20 | Stop reason: HOSPADM

## 2021-12-19 RX ORDER — ALBUTEROL SULFATE 90 UG/1
2 AEROSOL, METERED RESPIRATORY (INHALATION)
Status: DISCONTINUED | OUTPATIENT
Start: 2021-12-19 | End: 2021-12-20 | Stop reason: HOSPADM

## 2021-12-19 RX ORDER — ACETAMINOPHEN 500 MG
1000 TABLET ORAL
Status: COMPLETED | OUTPATIENT
Start: 2021-12-19 | End: 2021-12-19

## 2021-12-19 RX ORDER — DIPHENHYDRAMINE HYDROCHLORIDE 50 MG/ML
25 INJECTION INTRAMUSCULAR; INTRAVENOUS ONCE AS NEEDED
Status: DISCONTINUED | OUTPATIENT
Start: 2021-12-19 | End: 2021-12-20 | Stop reason: HOSPADM

## 2021-12-19 RX ORDER — SODIUM CHLORIDE 0.9 % (FLUSH) 0.9 %
10 SYRINGE (ML) INJECTION
Status: DISCONTINUED | OUTPATIENT
Start: 2021-12-19 | End: 2021-12-20 | Stop reason: HOSPADM

## 2021-12-19 RX ORDER — BENZONATATE 100 MG/1
100 CAPSULE ORAL 3 TIMES DAILY PRN
Qty: 10 CAPSULE | Refills: 0 | Status: SHIPPED | OUTPATIENT
Start: 2021-12-19 | End: 2021-12-29

## 2021-12-19 RX ORDER — EPINEPHRINE 0.3 MG/.3ML
0.3 INJECTION SUBCUTANEOUS
Status: DISCONTINUED | OUTPATIENT
Start: 2021-12-19 | End: 2021-12-20 | Stop reason: HOSPADM

## 2021-12-19 RX ADMIN — CASIRIVIMAB AND IMDEVIMAB 600 MG: 600; 600 INJECTION, SOLUTION, CONCENTRATE INTRAVENOUS at 05:12

## 2021-12-19 RX ADMIN — ACETAMINOPHEN 1000 MG: 500 TABLET ORAL at 04:12

## 2022-03-22 ENCOUNTER — OCCUPATIONAL HEALTH (OUTPATIENT)
Dept: URGENT CARE | Facility: CLINIC | Age: 32
End: 2022-03-22

## 2022-03-22 DIAGNOSIS — Z11.1 PPD SCREENING TEST: Primary | ICD-10-CM

## 2022-03-22 DIAGNOSIS — Z02.83 ENCOUNTER FOR DRUG SCREENING: ICD-10-CM

## 2022-03-22 LAB
CTP QC/QA: YES
POC 5 PANEL DRUG SCREEN: NEGATIVE

## 2022-03-22 PROCEDURE — 86580 TB INTRADERMAL TEST: CPT | Mod: S$GLB,,, | Performed by: FAMILY MEDICINE

## 2022-03-22 PROCEDURE — 86580 POCT TB SKIN TEST: ICD-10-PCS | Mod: S$GLB,,, | Performed by: FAMILY MEDICINE

## 2022-03-22 PROCEDURE — 80305 DRUG TEST PRSMV DIR OPT OBS: CPT | Mod: S$GLB,,, | Performed by: FAMILY MEDICINE

## 2022-03-22 PROCEDURE — 80305 POCT RAPID DRUG SCREEN 5 PANEL: ICD-10-PCS | Mod: S$GLB,,, | Performed by: FAMILY MEDICINE

## 2022-10-13 ENCOUNTER — OFFICE VISIT (OUTPATIENT)
Dept: URGENT CARE | Facility: CLINIC | Age: 32
End: 2022-10-13
Payer: COMMERCIAL

## 2022-10-13 ENCOUNTER — HOSPITAL ENCOUNTER (EMERGENCY)
Facility: HOSPITAL | Age: 32
Discharge: HOME | End: 2022-10-13
Attending: EMERGENCY MEDICINE
Payer: COMMERCIAL

## 2022-10-13 VITALS
DIASTOLIC BLOOD PRESSURE: 74 MMHG | SYSTOLIC BLOOD PRESSURE: 116 MMHG | OXYGEN SATURATION: 98 % | HEART RATE: 91 BPM | WEIGHT: 265 LBS | HEIGHT: 67 IN | BODY MASS INDEX: 41.59 KG/M2 | TEMPERATURE: 97 F | RESPIRATION RATE: 18 BRPM

## 2022-10-13 VITALS
HEART RATE: 87 BPM | OXYGEN SATURATION: 99 % | SYSTOLIC BLOOD PRESSURE: 136 MMHG | TEMPERATURE: 98 F | DIASTOLIC BLOOD PRESSURE: 77 MMHG | RESPIRATION RATE: 20 BRPM

## 2022-10-13 DIAGNOSIS — S69.92XA INJURY OF LEFT WRIST, INITIAL ENCOUNTER: ICD-10-CM

## 2022-10-13 DIAGNOSIS — S63.502A SPRAIN OF LEFT WRIST, INITIAL ENCOUNTER: Primary | ICD-10-CM

## 2022-10-13 DIAGNOSIS — S66.912A WRIST STRAIN, LEFT, INITIAL ENCOUNTER: Primary | ICD-10-CM

## 2022-10-13 DIAGNOSIS — Z02.6 ENCOUNTER RELATED TO WORKER'S COMPENSATION CLAIM: ICD-10-CM

## 2022-10-13 LAB
B-HCG UR QL: NEGATIVE
CTP QC/QA: YES

## 2022-10-13 PROCEDURE — 80305 OOH COLLECTION ONLY DRUG SCREEN: ICD-10-PCS | Mod: S$GLB,,,

## 2022-10-13 PROCEDURE — 99203 OFFICE O/P NEW LOW 30 MIN: CPT | Mod: S$GLB,,,

## 2022-10-13 PROCEDURE — 81025 URINE PREGNANCY TEST: CPT | Performed by: PHYSICIAN ASSISTANT

## 2022-10-13 PROCEDURE — 73110 X-RAY EXAM OF WRIST: CPT | Mod: FY,LT,S$GLB, | Performed by: RADIOLOGY

## 2022-10-13 PROCEDURE — 73110 XR WRIST COMPLETE 3 VIEWS LEFT: ICD-10-PCS | Mod: FY,LT,S$GLB, | Performed by: RADIOLOGY

## 2022-10-13 PROCEDURE — 99282 EMERGENCY DEPT VISIT SF MDM: CPT

## 2022-10-13 PROCEDURE — 80305 DRUG TEST PRSMV DIR OPT OBS: CPT | Mod: S$GLB,,,

## 2022-10-13 PROCEDURE — 99203 PR OFFICE/OUTPT VISIT, NEW, LEVL III, 30-44 MIN: ICD-10-PCS | Mod: S$GLB,,,

## 2022-10-13 NOTE — DISCHARGE INSTRUCTIONS
[facility]   [address]   [place] [address]   Phone [phone]   Fax [phone]        Thank you for letting myself and our team take care for you today! It was nice meeting you, and I hope you feel better very soon. Please come back to [facility] for all of your future medical needs.   Our goal in the ER is to always give you outstanding care and exceptional service. You may receive a survey by mail or email in the next week about your experience in our ED. We would greatly appreciate you completing and returning the survey. Your feedback provides us with a way to recognize our staff who give very good care and it helps us learn how to improve when your experience was below our aspiration of excellence.     Sincerely,     Quentin Jacobsen PA-C  Emergency Room Physician Assistant  [facility]

## 2022-10-13 NOTE — PROGRESS NOTES
Subjective:       Patient ID: Odalis Valadez is a 32 y.o. female.    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  Then she went to lift a patient with another co-woker when she felt a sharp pain in her wrist, she c/o pain when she move or twist her wrist .  She was seen at [facility] at noon and dx with a wrist sprain , no xray done      Wrist Injury   The incident occurred 6 to 12 hours ago. The incident occurred at work. Injury mechanism: twisting injury. Pain location: left wrist.     Musculoskeletal:  Positive for joint pain, joint swelling and abnormal ROM of joint.      Objective:      Physical Exam  Constitutional:       Appearance: Normal appearance.   Cardiovascular:      Rate and Rhythm: Normal rate and regular rhythm.      Pulses: Normal pulses.      Heart sounds: Normal heart sounds.   Pulmonary:      Effort: Pulmonary effort is normal.   Musculoskeletal:      Left wrist: Swelling and tenderness present. No effusion. Decreased range of motion. Normal pulse.      Comments: +2 radial pulse.  Patient has tenderness to palpation along the ulnar styloid.  Neurovascularly intact.  Patient unable to pronate and supinate due to pain.  She does have some superficial swelling.   Skin:     General: Skin is warm.      Capillary Refill: Capillary refill takes less than 2 seconds.   Neurological:      General: No focal deficit present.      Mental Status: She is alert and oriented to person, place, and time.       X-Ray Wrist Complete 3 views Left    Result Date: 10/13/2022  EXAMINATION: XR WRIST COMPLETE 3 VIEWS LEFT CLINICAL HISTORY: Unspecified injury of left wrist, hand and finger(s), initial encounter TECHNIQUE: PA, lateral, and oblique views of the left wrist were performed.  COMPARISON: None FINDINGS: No fracture or dislocation.  No bone destruction identified.  Small cysts noted in the navicular bone.     See above Electronically signed by: Romie Lerner MD Date:    10/13/2022 Time:    15:15     Assessment:       1. Wrist strain, left, initial encounter    2. Injury of left wrist, initial encounter    3. Encounter related to worker's compensation claim        Plan:       Personally reviewed x-ray which not show any acute fracture or dislocation.  Radiology confirmed.  Discussed with Urology results with patient.  Patient to follow-up with occupational health for further evaluation and clearance to return to work.  I advised patient to keep the Ace bandage and apply ice to the wrist.  She can take over-the-counter ibuprofen as needed for pain and swelling.  Patient verbalized understanding and agrees to treatment plan.       Patient Instructions: Keep dressing clean/dry/covered, Attention not to aggravate affected area, Apply ice 24-48 hours then apply heat/warm soaks   Restrictions:  (To be evaluated and determined by occupational health.)  No follow-ups on file.

## 2022-10-13 NOTE — ED TRIAGE NOTES
L lateral wrist pain that began after moving a patient at work today. Pain is worse with flexion. +2 radial pulse.

## 2022-10-13 NOTE — Clinical Note
"Odalis Scottsakshi Valadez was seen and treated in our emergency department on 10/13/2022.  She may return to work on 10/14/2022.       If you have any questions or concerns, please don't hesitate to call.      Quentin Jacobsen PA-C"

## 2022-10-13 NOTE — ED PROVIDER NOTES
"Encounter Date: 10/13/2022       History     Chief Complaint   Patient presents with    Wrist Pain     Left wrist injury this am while at work lifting trays worsened with assisting moving a patient. +edema no deformity , +radial pulse      32-year-old female presents to ED with concern of left wrist injury that occurred at work just prior to arrival.  Patient reports she awkwardly he "twisted" her right wrist while attempting to catch a falling food tray.  She reports her pain at that time was mild but then became more severe after she attempted to use her wrist to transport patient.  Current pain is sharp, worse with movement, improved with rest, nonradiating, severity 6/10.  No numbness or focal weakness.  No other reported injuries.  She has not taken any medications for her symptoms.  No other acute complaints at this time.    The history is provided by the patient.   Review of patient's allergies indicates:  No Known Allergies  No past medical history on file.  No past surgical history on file.  Family History   Problem Relation Age of Onset    Hypertension Mother     Stroke Father      Social History     Tobacco Use    Smoking status: Never    Smokeless tobacco: Never   Substance Use Topics    Alcohol use: No    Drug use: No     Review of Systems   Musculoskeletal:  Positive for arthralgias.   Neurological:  Negative for weakness and numbness.     Physical Exam     Initial Vitals [10/13/22 1220]   BP Pulse Resp Temp SpO2   136/77 87 20 98.3 °F (36.8 °C) 99 %      MAP       --         Physical Exam    Nursing note and vitals reviewed.  Constitutional: Vital signs are normal. She appears well-developed and well-nourished. She is active. She does not have a sickly appearance. She does not appear ill. No distress.   HENT:   Head: Normocephalic and atraumatic.   Neck:   Normal range of motion.  Musculoskeletal:         General: Tenderness present.      Cervical back: Normal range of motion.      Comments: Left " wrist tenderness over ulnar aspect.  No physical palpable deformities.  No significant swelling.  No overlying skin changes, rashes or wounds.  Very mild discomfort from flexion extension but pain does worsen with supination and pronation.  Appropriate ROM and sensation throughout all fingers.  Appropriate capillary refill and radial pulse.      Neurological: She is alert. GCS eye subscore is 4. GCS verbal subscore is 5. GCS motor subscore is 6.   Skin: Skin is warm and dry.   Psychiatric: She has a normal mood and affect. Her speech is normal and behavior is normal.       ED Course   Procedures  Labs Reviewed   POCT URINE PREGNANCY          Imaging Results    None          Medications - No data to display  Medical Decision Making:   Initial Assessment:   Patient presents with concern of left wrist injury that occurred at work just prior to arrival.  On exam, tenderness over ulnar aspect with no physical or palpable deformities.  Differential Diagnosis:   Strain, sprain, contusion, less likely fracture, dislocation  ED Management:  No direct impact wrist, therefore, low concern for acute bony abnormality or dislocation.  I do suspect symptoms are due to wrist sprain/strain and will plan to continue with conservative care.  Ace wrap applied in ED.  Encouraged home Tylenol/ibuprofen as needed, RICE, activities and movements as tolerated.  She was given information to follow-up with worker's Comp [facility].  ED return precautions discussed.  Patient states her understanding and agrees with plan.                        Clinical Impression:   Final diagnoses:  [S63.502A] Sprain of left wrist, initial encounter (Primary)      ED Disposition Condition    Discharge Stable          ED Prescriptions    None       Follow-up Information       Follow up With Specialties Details Why Contact Info    Your Doctor                 Quentin Jacobsen PA-C  10/13/22 0288

## 2022-10-13 NOTE — LETTER
[facility]  [address] [address]  Phone: [phone]  Fax: [phone]  [facility]: [email or web address]    Pt Name: Odalis Valadez  Injury Date: 10/13/2022   Employee ID:  Date of First Treatment: 10/13/2022   Company: [id number]      Appointment Time: 02:05 PM Arrived: 2:34 pm   Provider: Leydi Arnold PA-C Time Out:3:55 pm     Office Treatment:   1. Wrist strain, left, initial encounter    2. Injury of left wrist, initial encounter    3. Encounter related to worker's compensation claim          Patient Instructions: Keep dressing clean/dry/covered, Attention not to aggravate affected area, Apply ice 24-48 hours then apply heat/warm soaks    Restrictions:  (To be evaluated and determined by occupational health.)     Return Appointment: Visit date not found at follow up with occupational health

## 2022-10-13 NOTE — PROGRESS NOTES
Subjective:       Patient ID: Odalis Valadez is a 32 y.o. female.    Chief Complaint: No chief complaint on file.    HPI  ROS     Objective:      Physical Exam    Assessment:       No diagnosis found.    Plan:                   No follow-ups on file.

## 2022-10-25 ENCOUNTER — OFFICE VISIT (OUTPATIENT)
Dept: URGENT CARE | Facility: CLINIC | Age: 32
End: 2022-10-25
Payer: COMMERCIAL

## 2022-10-25 VITALS
BODY MASS INDEX: 41.59 KG/M2 | WEIGHT: 265 LBS | RESPIRATION RATE: 18 BRPM | HEART RATE: 73 BPM | SYSTOLIC BLOOD PRESSURE: 126 MMHG | OXYGEN SATURATION: 98 % | DIASTOLIC BLOOD PRESSURE: 83 MMHG | HEIGHT: 67 IN

## 2022-10-25 DIAGNOSIS — S66.912A STRAIN OF LEFT WRIST, INITIAL ENCOUNTER: ICD-10-CM

## 2022-10-25 DIAGNOSIS — Z02.6 ENCOUNTER RELATED TO WORKER'S COMPENSATION CLAIM: Primary | ICD-10-CM

## 2022-10-25 PROCEDURE — 99212 PR OFFICE/OUTPT VISIT, EST, LEVL II, 10-19 MIN: ICD-10-PCS | Mod: S$GLB,,,

## 2022-10-25 PROCEDURE — 99212 OFFICE O/P EST SF 10 MIN: CPT | Mod: S$GLB,,,

## 2022-10-25 NOTE — LETTER
[facility]  [address] [address]  Phone: [phone]  Fax: [phone]  [facility]: [email or web address]    Pt Name: Odalis Valadez  Injury Date: 10/13/2022   Employee ID: [id number] Date of First Treatment: 10/25/2022   Company: [employer]      Appointment Time: 01:00 PM Arrived: 12:51 PM   Provider: Stefano Sherman, DO Time Out: 1:50 PM     Office Treatment:   1. Encounter related to worker's compensation claim    2. Strain of left wrist, initial encounter          Patient Instructions:  (Use over-the-counter anti-inflammatory as directed on label)      Restrictions:  (Limited use of left hand and wrist)     Return Appointment: 10/28/2022 at 11:30 AM

## 2022-10-25 NOTE — PROGRESS NOTES
Subjective:       Patient ID: Odalis Valadez is a 32 y.o. female.    Chief Complaint: Wrist Injury (LT)    See MA note. RHD.  She developed pain to the ulnar aspect of her left wrist with food tray she was carrying shifted and she suddenly compensated.  Pain was dull initially, but worsened after she helped a colleague move a patient.  Treatment has been conservative to this point including over-the-counter Tylenol and Ace wrap use.  She has not returned to work as of this date.  Her left wrist symptoms have improved since the initial injury.    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 While carring a heavy tray down the hallway , the tray started to fall and she jerked her LT Wrist to stop the tray from hitting the floor. Later that day she assisted another person to move a patient and her wrist started hurting worse. Paibn score 1/10 with complaints of Intermittent Throbbing/Aching pain, Muscle weakness, No swelling, No bruising, No numbness/tingling. Taking Tylenol as needed for pain. SH    Wrist Injury   Pertinent negatives include no numbness.   Constitution: Negative.   HENT: Negative.     Neck: neck negative.   Cardiovascular: Negative.    Eyes: Negative.    Respiratory: Negative.     Gastrointestinal: Negative.    Endocrine: negative.   Genitourinary: Negative.    Musculoskeletal:  Positive for joint pain. Negative for trauma, joint swelling, abnormal ROM of joint and muscle cramps.   Skin: Negative.  Negative for erythema and bruising.   Neurological: Negative.  Negative for numbness and tingling.      Objective:      Physical Exam  Vitals and nursing note reviewed.   Constitutional:       General: She is not in acute distress.  HENT:      Head: Normocephalic.   Eyes:      General: Lids are normal.      Conjunctiva/sclera: Conjunctivae normal.   Musculoskeletal:      Right forearm: Normal. No swelling or edema.      Left forearm: Normal. No  swelling or edema.      Right wrist: No swelling, deformity or tenderness. Normal range of motion. Normal pulse.      Left wrist: No swelling, deformity or tenderness. Decreased range of motion. Normal pulse.      Right hand: Normal. No swelling or deformity. Normal range of motion. Normal pulse.      Left hand: Normal. No swelling or deformity. Normal range of motion. Normal pulse.        Hands:       Cervical back: Normal range of motion.      Comments: No gross deformity noted to the bilateral hands or wrists.  Normal sensation and  strength.  Full range of motion of thumb and fingers bilaterally.  Decreased left wrist flexion which elicits dull pain to the ulnar aspect of left wrist.  Occasional audible popping is noted to her left wrist with pronation motion.  Negative Finkelstein's testing.   Skin:     General: Skin is warm.      Capillary Refill: Capillary refill takes less than 2 seconds.      Findings: No erythema.   Neurological:      General: No focal deficit present.      Mental Status: She is alert and oriented to person, place, and time.      Gait: Gait is intact.   Psychiatric:         Attention and Perception: Attention normal.         Mood and Affect: Mood and affect normal.         Speech: Speech normal.         Behavior: Behavior normal. Behavior is cooperative.       Assessment:       1. Encounter related to worker's compensation claim    2. Strain of left wrist, initial encounter          Plan:       Symptoms have improved with conservative care since the initial injury.  I have asked her to continue this treatment regimen but use over-the-counter anti-inflammatory rather than Tylenol.  He worked limitation of limited use of the left hand and wrist is recommended at this time.  Return in approximately 3 days for re-evaluation of symptoms.     Patient Instructions:  (Use over-the-counter anti-inflammatory as directed on label)   Restrictions:  (Limited use of left hand and wrist)  Follow up  in about 3 days (around 10/28/2022).

## 2022-11-01 ENCOUNTER — OFFICE VISIT (OUTPATIENT)
Dept: URGENT CARE | Facility: CLINIC | Age: 32
End: 2022-11-01
Payer: COMMERCIAL

## 2022-11-01 VITALS
SYSTOLIC BLOOD PRESSURE: 131 MMHG | BODY MASS INDEX: 41.59 KG/M2 | RESPIRATION RATE: 18 BRPM | HEART RATE: 77 BPM | HEIGHT: 67 IN | WEIGHT: 265 LBS | DIASTOLIC BLOOD PRESSURE: 78 MMHG

## 2022-11-01 DIAGNOSIS — Z02.6 ENCOUNTER RELATED TO WORKER'S COMPENSATION CLAIM: Primary | ICD-10-CM

## 2022-11-01 DIAGNOSIS — S66.912D STRAIN OF LEFT WRIST, SUBSEQUENT ENCOUNTER: ICD-10-CM

## 2022-11-01 PROCEDURE — 99213 PR OFFICE/OUTPT VISIT, EST, LEVL III, 20-29 MIN: ICD-10-PCS | Mod: S$GLB,,,

## 2022-11-01 PROCEDURE — 99213 OFFICE O/P EST LOW 20 MIN: CPT | Mod: S$GLB,,,

## 2022-11-01 RX ORDER — NAPROXEN 500 MG/1
500 TABLET ORAL 2 TIMES DAILY
Qty: 30 TABLET | Refills: 1 | Status: SHIPPED | OUTPATIENT
Start: 2022-11-01

## 2022-11-01 NOTE — LETTER
[facility]  [address] [address]  Phone: [phone]  Fax: [phone]  [facility]: [id number]    Pt Name: Odalis Valadez  Injury Date: 10/13/2022   Employee ID:  Date of First Treatment: 11/01/2022   Company: [employer] to record [employer] [address]      Appointment Time: 12:30 PM Arrived: 12:33 PM   Provider: Stefano Sherman, DO Time Out:01:25 PM     Office Treatment:   1. Encounter related to worker's compensation claim    2. Strain of left wrist, subsequent encounter      Medications Ordered This Encounter   Medications    naproxen (NAPROSYN) 500 MG tablet      Patient Instructions: PT to be scheduled once authorized      Restrictions:  (Limited use of left hand and wrist)     Return Appointment: 11/15/2022 at 09:30 AM     SH

## 2022-11-01 NOTE — PROGRESS NOTES
Subjective:       Patient ID: Odalis Valadez is a 32 y.o. female.    Chief Complaint: Wrist Injury (LT)    See MA note.  No changes since last evaluation.  She experiences pain to the ulnar aspect of her left wrist with movement.  Still does not have the same strength when attempting to  heavier items.     Follow-up of LT Wrist Injury ( DOI 10-13-22 ) Pain score 1/10 with complaints of Intermittent Sharp pain w/certain movements, Muscle weakness-can't  heavy objects, No swelling, No numbness/tingling. No meds at this time. SH    Wrist Injury   Pertinent negatives include no numbness.   Constitution: Negative.   HENT: Negative.     Neck: neck negative.   Cardiovascular: Negative.    Eyes: Negative.    Respiratory: Negative.     Gastrointestinal: Negative.    Endocrine: negative.   Genitourinary: Negative.    Musculoskeletal:  Positive for joint pain. Negative for pain, trauma, joint swelling, abnormal ROM of joint, muscle cramps and muscle ache.   Skin: Negative.  Negative for erythema and bruising.   Neurological: Negative.  Negative for numbness and tingling.      Objective:      Physical Exam  Vitals and nursing note reviewed.   HENT:      Head: Normocephalic.   Eyes:      Conjunctiva/sclera: Conjunctivae normal.   Musculoskeletal:      Left forearm: Normal.      Left wrist: No swelling or deformity. Decreased range of motion.      Left hand: No swelling, deformity or tenderness. Normal range of motion.        Hands:       Comments: No gross deformity noted to the left fingers, hand or wrist.  Decreased flexion and extension at the left wrist.  Flexion and pronation does elicit pain to the ulnar aspect of the left wrist.  This area is nontender on palpation.   Skin:     General: Skin is warm.      Capillary Refill: Capillary refill takes less than 2 seconds.      Findings: No erythema.   Neurological:      General: No focal deficit present.      Mental Status: She is alert and oriented to  person, place, and time.      Gait: Gait is intact.   Psychiatric:         Attention and Perception: Attention normal.         Mood and Affect: Mood and affect normal.         Speech: Speech normal.         Behavior: Behavior normal. Behavior is cooperative.       Assessment:       1. Encounter related to worker's compensation claim    2. Strain of left wrist, subsequent encounter          Plan:       Subjectively and clinically there has been no change since last evaluation.  Therefore, I will order naproxen for twice a day use and physical therapy to help with her mobility and increase her strength.  Maintain current limitations for work.  She did states she is working on a p.r.n. status and has not returned back to work at this time        Medications Ordered This Encounter   Medications    naproxen (NAPROSYN) 500 MG tablet     Sig: Take 1 tablet (500 mg total) by mouth 2 (two) times daily.     Dispense:  30 tablet     Refill:  1     Patient Instructions: PT to be scheduled once authorized   Restrictions:  (Limited use of left hand and wrist)  Follow up in about 2 weeks (around 11/15/2022).

## 2022-11-16 ENCOUNTER — TELEPHONE (OUTPATIENT)
Dept: URGENT CARE | Facility: CLINIC | Age: 32
End: 2022-11-16
Payer: MEDICAID

## 2022-11-16 NOTE — TELEPHONE ENCOUNTER
Called patient and she verbalized the new [facility] health  Appt, date & time. AFG

## 2022-11-18 PROBLEM — S66.912A STRAIN OF LEFT WRIST: Status: ACTIVE | Noted: 2022-11-18

## 2022-11-18 PROBLEM — M54.50 ACUTE BILATERAL LOW BACK PAIN WITHOUT SCIATICA: Status: RESOLVED | Noted: 2021-04-08 | Resolved: 2022-11-18

## 2022-11-18 PROBLEM — M25.552 LEFT HIP PAIN: Status: RESOLVED | Noted: 2021-04-08 | Resolved: 2022-11-18

## 2022-11-22 ENCOUNTER — OFFICE VISIT (OUTPATIENT)
Dept: URGENT CARE | Facility: CLINIC | Age: 32
End: 2022-11-22
Payer: COMMERCIAL

## 2022-11-22 VITALS
SYSTOLIC BLOOD PRESSURE: 144 MMHG | BODY MASS INDEX: 41.59 KG/M2 | DIASTOLIC BLOOD PRESSURE: 78 MMHG | WEIGHT: 265 LBS | HEIGHT: 67 IN | RESPIRATION RATE: 18 BRPM | OXYGEN SATURATION: 100 % | TEMPERATURE: 98 F | HEART RATE: 99 BPM

## 2022-11-22 DIAGNOSIS — S66.912D STRAIN OF LEFT WRIST, SUBSEQUENT ENCOUNTER: Primary | ICD-10-CM

## 2022-11-22 PROCEDURE — 99214 OFFICE O/P EST MOD 30 MIN: CPT | Mod: S$GLB,,, | Performed by: EMERGENCY MEDICINE

## 2022-11-22 PROCEDURE — 99214 PR OFFICE/OUTPT VISIT, EST, LEVL IV, 30-39 MIN: ICD-10-PCS | Mod: S$GLB,,, | Performed by: EMERGENCY MEDICINE

## 2022-11-22 NOTE — PROGRESS NOTES
Subjective:       Patient ID: Odalis Valadez is a 32 y.o. female.    Chief Complaint: Wrist Injury (left)    WC Follow-up of LT Wrist Injury ( DOI 10-13-22)   She states that her wrist still hurts and cant move it still to much and she also found out she pregnant and she cant taken any of the med's to help with the pain .LM    PATIENT REPORTS NO SIGNIFICANT SWELLING OR PAIN HOWEVER DOES OCCASIONALLY THROB.  SHE RECENTLY FOUND OUT SHE WAS PREGNANT AND CERTAINLY WILL SWITCH HER FROM ANY SORT OF NSAID TO TYLENOL ONLY WITH THE CONTINUED USE OF REST, ICE, ELEVATION AND PHYSICAL THERAPY STARTS NEXT WEEK.  WE WILL CONTINUE THE SAME RESTRICTIONS AS PRIOR AND HAVE HER RETURN IN 2 WEEKS GIVING TIME FOR A FEW PHYSICAL THERAPY SESSIONS UNDER HER BELT AND POSSIBLY LIFTING SOME RESTRICTIONS DEPENDING ON PROGRESS NEXT VISIT.    Wrist Injury   Pertinent negatives include no numbness.     ROS    Constitution: Negative. Positive for generalized weakness.   HENT: Negative.     Neck: neck negative.   Cardiovascular: Negative.    Eyes: Negative.    Respiratory: Negative.     Gastrointestinal: Negative.    Endocrine: negative.   Genitourinary: Negative.    Musculoskeletal:  Positive for joint pain. Negative for pain, trauma, joint swelling, abnormal ROM of joint, muscle cramps and muscle ache.   Skin: Negative.  Negative for erythema and bruising.   Neurological: Negative.  Negative for numbness and tingling.      Objective:      Physical Exam  Vitals and nursing note reviewed.   HENT:      Head: Normocephalic.   Eyes:      Conjunctiva/sclera: Conjunctivae normal.   Musculoskeletal:      Left forearm: Normal.      Left wrist: No swelling or deformity. Decreased range of motion.      Left hand: No swelling, deformity or tenderness. Normal range of motion.        Hands:       Comments: No gross deformity noted to the left fingers, hand or wrist.  Decreased flexion and extension at the left wrist.  Flexion and pronation does elicit  pain to the ulnar aspect of the left wrist.  This area is nontender on palpation.   Skin:     General: Skin is warm.      Capillary Refill: Capillary refill takes less than 2 seconds.      Findings: No erythema.   Neurological:      General: No focal deficit present.      Mental Status: She is alert and oriented to person, place, and time.      Gait: Gait is intact.   Psychiatric:         Attention and Perception: Attention normal.         Mood and Affect: Mood and affect normal.         Speech: Speech normal.         Behavior: Behavior normal. Behavior is cooperative.         Assessment:       1. Strain of left wrist, subsequent encounter        Plan:       PATIENT REPORTS NO SIGNIFICANT SWELLING OR PAIN HOWEVER DOES OCCASIONALLY THROB.  SHE RECENTLY FOUND OUT SHE WAS PREGNANT AND CERTAINLY WILL SWITCH HER FROM ANY SORT OF NSAID TO TYLENOL ONLY WITH THE CONTINUED USE OF REST, ICE, ELEVATION AND PHYSICAL THERAPY STARTS NEXT WEEK.  WE WILL CONTINUE THE SAME RESTRICTIONS AS PRIOR AND HAVE HER RETURN IN 2 WEEKS GIVING TIME FOR A FEW PHYSICAL THERAPY SESSIONS UNDER HER BELT AND POSSIBLY LIFTING SOME RESTRICTIONS DEPENDING ON PROGRESS NEXT VISIT.     Patient Instructions: Attention not to aggravate affected area, Daily home exercises/warm soaks, Apply ice 24-48 hours then apply heat/warm soaks, Elevated affected area, Begin Physical Therapy   Restrictions: Limited use of left hand and arm  Follow up in about 2 weeks (around 12/6/2022).

## 2022-11-22 NOTE — LETTER
[facility]  [address] [address]  Phone: [phone]  Fax: [phone]  [facility]: [id number]    Pt Name: Odalis Valadez  Injury Date: 10/13/2022   Employee ID: [id number] Date of Treatment: 11/22/2022   Company: [employer]      Appointment Time: 09:10 AM Arrived: 9:27 AM    Provider: John Cast MD Time Out:10:31 AM      Office Treatment:   1. Strain of left wrist, subsequent encounter          Patient Instructions: Attention not to aggravate affected area, Daily home exercises/warm soaks, Apply ice 24-48 hours then apply heat/warm soaks, Elevated affected area, Begin Physical Therapy (ONLY TYLENOL, NO NSAIDS.)      Restrictions: Limited use of left hand and arm     Return Appointment: 12/6/2022 at 9:45 AM YG

## 2022-11-25 ENCOUNTER — CLINICAL SUPPORT (OUTPATIENT)
Dept: REHABILITATION | Facility: HOSPITAL | Age: 32
End: 2022-11-25
Payer: COMMERCIAL

## 2022-11-25 DIAGNOSIS — S66.912D STRAIN OF LEFT WRIST, SUBSEQUENT ENCOUNTER: Primary | ICD-10-CM

## 2022-11-25 PROCEDURE — 97110 THERAPEUTIC EXERCISES: CPT | Mod: PN

## 2022-11-25 PROCEDURE — 97140 MANUAL THERAPY 1/> REGIONS: CPT | Mod: PN

## 2022-11-25 PROCEDURE — 97162 PT EVAL MOD COMPLEX 30 MIN: CPT | Mod: PN

## 2022-11-25 NOTE — PROGRESS NOTES
[facility]  Physical Therapy Initial Evaluation    Name: Odalis Valadez  [facility] Number: [id number]    Therapy Diagnosis:   Encounter Diagnosis   Name Primary?    Strain of left wrist, subsequent encounter Yes     Physician: Stefano Sherman,     Physician Orders: PT Eval and Treat   Medical Diagnosis from Referral: S66.912D (ICD-10-CM) - Strain of left wrist, subsequent encounter   Evaluation Date: 11/25/2022  Authorization Period Expiration: 11/1/2023  Plan of Care Expiration: 01/25/2023  Visit # / Visits authorized: 1/12     Time In: 02:00pm (late arrival)   Time Out: 03:00pm  Total Appointment Time (timed & untimed codes): 60 minutes    Precautions: Standard    Subjective   Date of injury: October of 2022  History of current condition - patient states that she sprained her L wrist in October of 2022 when carrying a lunch tray at work. Plate was sliding off of tray and she tried to catch it resulting in twisting her wrist.      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 Was also working full time at [employer] as her other job.  Current work status: not currently working per MD request. Last worked in October of 2022. 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 Patient plans on returning to MD in about two weeks for follow up.    Imaging, x-ray of hand unremarkable     Social History: has help from  and daughter at home if needed     Pain:  Current 0/10, worst 2/10, best 0/10   Location: L ulnar aspect of wrist   Description: intermittent, throbbing and numbing sensations into palm of hand   Aggravating Factors: limitation in lifting a case of water, limitation in carrying a pot in L hand   Alleviating Factors: nothing in particular      Pt's goals: Return to employment according to previous level of function     Medical History:   No past medical history on file.    Surgical History:   Odalis Valadez  has no past surgical history on file.    Medications:   Odalis has a current medication list which includes the following prescription(s): lidocaine, naproxen, sertraline, and tizanidine.    Allergies:   Review of patient's allergies indicates:  No Known Allergies     Objective   11/25/2022:  R hand dominant    Active Range of Motion: Measured in degrees  Elbow Right  Left    WNL per gross seated assessment     Shoulder Right  Left    WNL per gross seated assessment     Wrist Right  Left    Flexion 71 52 feels stiffness and tightness    Extension 65 53   Radial Deviation 27 46   Ulnar Deviation 40 41 feels stiffness and tightness    Supination 20 without compensation    90 with compensation from elbow 40 without compensation     74 with compensation from elbow    Pronation 90 81     Manual Muscle Testing:   Elbow Right  Left   Flexion 4+/5 4+/5   Extension 4/5 4+/5     Shoulder Right  Left   Flexion 4+/5 4-/5   Abduction 4+/5 4/5   ER 4/5 4/5   IR 4+/5 4-/5     Wrist Right  Left    Flexion 4/5 4/5   Extension 4/5 4/5   Radial Deviation 4/5 crepitus at R CMC joint 4/5   Ulnar Deviation 4/5 4/5   Supination 4/5 4-/5 pain at L ulnar aspect of wrist   Pronation 4/5 4/5     Palpation Elbow/Wrist:  Distal UE/hand: light touch sensation: equal and intact bilaterally           Strength: HHD Average 3 trials, Position II:   11/25/2022 11/25/2022    Right  Left    Elbow Flexed 39# 7# pain from pressure   Elbow Extended 20# 10# pain from pressure    Digit        El   21# 10#   Lateral key  23# 15# pain at L ulnar border of wrist      Edema Wrist:  R: 16 cm  L: 16.5 cm    Special tests:   Prayer test: no provocation of altered sensation   Reverse prayer test: provocation of altered sensation into palm of hand and tips of digits  "2-5      Functional Job Specific Testing:   Job Specific Task Job Demands Current Ability Deficit? (Yes or No)   1. Pivot lifting patients from chair or bed, with use of gait belt  Frequently  Not completing at this time  Suspected    2. Assisting patients with showers  About 3 times/ day Not completing at this time Suspected    3. Pushing/pulling patient in chairs  About 50 feet  Not completing at this time  Suspected      Limitation/Restriction for FOTO Wrist Survey    Therapist reviewed FOTO scores    FOTO documents entered into [facility] - see Media section.    Limitation Score: 41%  Predicted Score: 30%     TREATMENT   Total Treatment time (time-based codes) separate from Evaluation: 35 minutes    therapeutic exercises to develop strength, endurance, ROM, flexibility, posture, and core stabilization for 25 minutes:  Patient education on nature of current condition and PHYSICAL THERAPY POC  Written HOME EXERCISE PROGRAM provided, reviewed, patient demo understanding     L forearm pronation/supination with hammer, 10" hold each way x 10    L wrist flex stretch, 20"x4   L wrist ext stretch, 20"x4   (Initiate) L  strength with elbow flexed   (Initiate) flex bar 'rainbow' and 'smile'  (Initiate) L wrist flex/ext, pro/sup, rad/ulnar dev isometrics, 5" hold, 2x10 each     manual therapy techniques: Joint mobilizations, Manual traction, Myofacial release, Soft tissue Mobilization, and Friction Massage for 10 minutes:  L forearm pronation/supination Gr III/IV JM   L wrist rad/ulnar dev, flex/ext Gr III/IV- JM    therapeutic activities to improve functional performance for 00  minutes:  (Initiate) push/pull  (Initiate) overhand lift   (Initiate) underhand lift     Home Exercises and Patient Education Provided  Education provided:   - yes    Home Exercises Provided: yes.  Exercises were reviewed and Odalis was able to demonstrate them prior to the end of the session.  Odalis demonstrated good  understanding of the " education provided.      Assessment   Odalis is a 32 y.o. female referred to outpatient Physical Therapy with a medical diagnosis of S66.912D (ICD-10-CM) - Strain of left wrist, subsequent encounter. Pt presents with decreased ROM, muscle strength, flexibility, joint mobility. Increased pain, stiffness, soft tissue restriction. Impaired posture, joint mechanics.     The patient's current job specific task deficits include the following: limitation in lifting, pushing, pulling, carrying and transferring patients.     Pt prognosis: good   Rehab potential: good     Skilled Physical Therapy intervention is required at this time for the injured worker to address the musculoskeletal limitations and work-related functional deficits for their job as a patient care tech.    Plan of care discussed with patient: Yes  Pt's spiritual, cultural and educational needs considered and patient is agreeable to the plan of care and goals as stated.    Anticipated Barriers for therapy:  none    Medical Necessity is demonstrated by the following  History  Co-morbidities and personal factors that may impact the plan of care Co-morbidities:   Not listed     Personal Factors:   lifestyle     moderate   Examination  Body Structures and Functions, activity limitations and participation restrictions that may impact the plan of care Body Regions:   upper extremities    Body Systems:    ROM  strength  transfers  transitions  motor control  edema    Participation Restrictions:   Limitation in ADL, self care, social/recaretional tasks as well as work related duties     Activity limitations:   Learning and applying knowledge  no deficits    General Tasks and Commands  no deficits    Communication  no deficits    Mobility  lifting and carrying objects  fine hand use (grasping/picking up)  driving (bike, car, motorcycle)    Self care  caring for body parts (brushing teeth, shaving, grooming)  dressing  looking after one's health    Domestic  Life  shopping  cooking  doing house work (cleaning house, washing dishes, laundry)  assisting others    Interactions/Relationships  basic interpersonal interactions  complex interpersonal interactions  relating with strangers  formal relationships  family relationships    Life Areas  employment  basic economic transactions    Community and Social Life  community life  recreation and leisure         moderate   Clinical Presentation evolving clinical presentation with changing clinical characteristics moderate   Decision Making/ Complexity Score: moderate     Goals:   Short Term Goals: 2 weeks   (1) patient will be I with HOME EXERCISE PROGRAM (initial, not met)     (2) patient will obtain 71 deg L wrist flex ACTIVE RANGE OF MOTION to facilitate improved ability to assist patients with standing for showers at work (initial, not met)     (3) patient will 65 deg L wrist ext ACTIVE RANGE OF MOTION to indicate improved ability to style hair to get ready for work (initial, not met)     Long Term Goals: 4 weeks   (1) patient will obtain 4/5 L forearm supination MMT to facilitate improved ability to use gait belts while assisting patients with pivot transfers from chair to bed at work (initial, not met)     (2) patient will obtain 35# L  strength using HHD with elbow flexed to facilitate improved safety and efficiency with pushing and pulling patients in wheelchairs at work (initial, not met)     (3) pt will return to work full duty, full time, according to previous level of function.     Plan   Plan of care Certification: 11/25/2022 to 01/25/2022.    Outpatient Physical Therapy 3 times weekly for 4 weeks to include the following interventions: Cervical/Lumbar Traction, Electrical Stimulation , re-eval, dry needling, Gait Training, Manual Therapy, Moist Heat/ Ice, Neuromuscular Re-ed, Patient Education, Self Care, Therapeutic Activities, and Therapeutic Exercise.     Physical therapist and physical therapy assistant(s)  met face to face to discuss patient's treatment plan and progress towards established goals. Pt will be seen by a physical therapist minimally every 6th visit or every 30 days.    Upon discharge from further skilled PT intervention it will determined if the need for a work conditioning program or Functional Capacity Evaluation is required to allow the injured worker to return to work with full potential achieved, continued improvement with body mechanics with advanced functional activities, and further prevention of future work-related injuries.     Kathy Burroughs, PT  11/25/2022    I CERTIFY THE NEED FOR THESE SERVICES FURNISHED UNDER THIS PLAN OF TREATMENT AND WHILE UNDER MY CARE    Physician's comments:        Physician's Signature: ___________________________________________________

## 2022-11-25 NOTE — PLAN OF CARE
[facility]  Physical Therapy Initial Evaluation    Name: Odalis Valadez  [facility] Number: [id number]    Therapy Diagnosis:   Encounter Diagnosis   Name Primary?    Strain of left wrist, subsequent encounter Yes     Physician: Stefano Sherman,     Physician Orders: PT Eval and Treat   Medical Diagnosis from Referral: S66.912D (ICD-10-CM) - Strain of left wrist, subsequent encounter   Evaluation Date: 11/25/2022  Authorization Period Expiration: 11/1/2023  Plan of Care Expiration: 01/25/2023  Visit # / Visits authorized: 1/12     Time In: 02:00pm (late arrival)   Time Out: 03:00pm  Total Appointment Time (timed & untimed codes): 60 minutes    Precautions: Standard    Subjective   Date of injury: October of 2022  History of current condition - patient states that she sprained her L wrist in October of 2022 when carrying a lunch tray at work. Plate was sliding off of tray and she tried to catch it resulting in twisting her wrist.      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 Was also working full time at [employer] as her other job.  Current work status: not currently working per MD request. Last worked in October of 2022. 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 Patient plans on returning to MD in about two weeks for follow up.    Imaging, x-ray of hand unremarkable     Social History: has help from  and daughter at home if needed     Pain:  Current 0/10, worst 2/10, best 0/10   Location: L ulnar aspect of wrist   Description: intermittent, throbbing and numbing sensations into palm of hand   Aggravating Factors: limitation in lifting a case of water, limitation in carrying a pot in L hand   Alleviating Factors: nothing in particular      Pt's goals: Return to employment according to previous level of function     Medical History:   No past medical history on file.    Surgical History:   Odalis Valadez  has no past surgical history on file.    Medications:   Odalis has a current medication list which includes the following prescription(s): lidocaine, naproxen, sertraline, and tizanidine.    Allergies:   Review of patient's allergies indicates:  No Known Allergies     Objective   11/25/2022:  R hand dominant    Active Range of Motion: Measured in degrees  Elbow Right  Left    WNL per gross seated assessment     Shoulder Right  Left    WNL per gross seated assessment     Wrist Right  Left    Flexion 71 52 feels stiffness and tightness    Extension 65 53   Radial Deviation 27 46   Ulnar Deviation 40 41 feels stiffness and tightness    Supination 20 without compensation    90 with compensation from elbow 40 without compensation     74 with compensation from elbow    Pronation 90 81     Manual Muscle Testing:   Elbow Right  Left   Flexion 4+/5 4+/5   Extension 4/5 4+/5     Shoulder Right  Left   Flexion 4+/5 4-/5   Abduction 4+/5 4/5   ER 4/5 4/5   IR 4+/5 4-/5     Wrist Right  Left    Flexion 4/5 4/5   Extension 4/5 4/5   Radial Deviation 4/5 crepitus at R CMC joint 4/5   Ulnar Deviation 4/5 4/5   Supination 4/5 4-/5 pain at L ulnar aspect of wrist   Pronation 4/5 4/5     Palpation Elbow/Wrist:  Distal UE/hand: light touch sensation: equal and intact bilaterally           Strength: HHD Average 3 trials, Position II:   11/25/2022 11/25/2022    Right  Left    Elbow Flexed 39# 7# pain from pressure   Elbow Extended 20# 10# pain from pressure    Digit        El   21# 10#   Lateral key  23# 15# pain at L ulnar border of wrist      Edema Wrist:  R: 16 cm  L: 16.5 cm    Special tests:   Prayer test: no provocation of altered sensation   Reverse prayer test: provocation of altered sensation into palm of hand and tips of digits  "2-5      Functional Job Specific Testing:   Job Specific Task Job Demands Current Ability Deficit? (Yes or No)   1. Pivot lifting patients from chair or bed, with use of gait belt  Frequently  Not completing at this time  Suspected    2. Assisting patients with showers  About 3 times/ day Not completing at this time Suspected    3. Pushing/pulling patient in chairs  About 50 feet  Not completing at this time  Suspected      Limitation/Restriction for FOTO Wrist Survey    Therapist reviewed FOTO scores    FOTO documents entered into [facility] - see Media section.    Limitation Score: 41%  Predicted Score: 30%     TREATMENT   Total Treatment time (time-based codes) separate from Evaluation: 35 minutes    therapeutic exercises to develop strength, endurance, ROM, flexibility, posture, and core stabilization for 25 minutes:  Patient education on nature of current condition and PHYSICAL THERAPY POC  Written HOME EXERCISE PROGRAM provided, reviewed, patient demo understanding     L forearm pronation/supination with hammer, 10" hold each way x 10    L wrist flex stretch, 20"x4   L wrist ext stretch, 20"x4   (Initiate) L  strength with elbow flexed   (Initiate) flex bar 'rainbow' and 'smile'  (Initiate) L wrist flex/ext, pro/sup, rad/ulnar dev isometrics, 5" hold, 2x10 each     manual therapy techniques: Joint mobilizations, Manual traction, Myofacial release, Soft tissue Mobilization, and Friction Massage for 10 minutes:  L forearm pronation/supination Gr III/IV JM   L wrist rad/ulnar dev, flex/ext Gr III/IV- JM    therapeutic activities to improve functional performance for 00  minutes:  (Initiate) push/pull  (Initiate) overhand lift   (Initiate) underhand lift     Home Exercises and Patient Education Provided  Education provided:   - yes    Home Exercises Provided: yes.  Exercises were reviewed and Odalis was able to demonstrate them prior to the end of the session.  Odalis demonstrated good  understanding of the " education provided.      Assessment   Odalis is a 32 y.o. female referred to outpatient Physical Therapy with a medical diagnosis of S66.912D (ICD-10-CM) - Strain of left wrist, subsequent encounter. Pt presents with decreased ROM, muscle strength, flexibility, joint mobility. Increased pain, stiffness, soft tissue restriction. Impaired posture, joint mechanics.     The patient's current job specific task deficits include the following: limitation in lifting, pushing, pulling, carrying and transferring patients.     Pt prognosis: good   Rehab potential: good     Skilled Physical Therapy intervention is required at this time for the injured worker to address the musculoskeletal limitations and work-related functional deficits for their job as a patient care tech.    Plan of care discussed with patient: Yes  Pt's spiritual, cultural and educational needs considered and patient is agreeable to the plan of care and goals as stated.    Anticipated Barriers for therapy: none    Medical Necessity is demonstrated by the following  History  Co-morbidities and personal factors that may impact the plan of care Co-morbidities:   Not listed     Personal Factors:   lifestyle     moderate   Examination  Body Structures and Functions, activity limitations and participation restrictions that may impact the plan of care Body Regions:   upper extremities    Body Systems:    ROM  strength  transfers  transitions  motor control  edema    Participation Restrictions:   Limitation in ADL, self care, social/recaretional tasks as well as work related duties     Activity limitations:   Learning and applying knowledge  no deficits    General Tasks and Commands  no deficits    Communication  no deficits    Mobility  lifting and carrying objects  fine hand use (grasping/picking up)  driving (bike, car, motorcycle)    Self care  caring for body parts (brushing teeth, shaving, grooming)  dressing  looking after one's health    Domestic  Life  shopping  cooking  doing house work (cleaning house, washing dishes, laundry)  assisting others    Interactions/Relationships  basic interpersonal interactions  complex interpersonal interactions  relating with strangers  formal relationships  family relationships    Life Areas  employment  basic economic transactions    Community and Social Life  community life  recreation and leisure         moderate   Clinical Presentation evolving clinical presentation with changing clinical characteristics moderate   Decision Making/ Complexity Score: moderate     Goals:   Short Term Goals: 2 weeks   (1) patient will be I with HOME EXERCISE PROGRAM (initial, not met)     (2) patient will obtain 71 deg L wrist flex ACTIVE RANGE OF MOTION to facilitate improved ability to assist patients with standing for showers at work (initial, not met)     (3) patient will 65 deg L wrist ext ACTIVE RANGE OF MOTION to indicate improved ability to style hair to get ready for work (initial, not met)     Long Term Goals: 4 weeks   (1) patient will obtain 4/5 L forearm supination MMT to facilitate improved ability to use gait belts while assisting patients with pivot transfers from chair to bed at work (initial, not met)     (2) patient will obtain 35# L  strength using HHD with elbow flexed to facilitate improved safety and efficiency with pushing and pulling patients in wheelchairs at work (initial, not met)     (3) pt will return to work full duty, full time, according to previous level of function.     Plan   Plan of care Certification: 11/25/2022 to 01/25/2022.    Outpatient Physical Therapy 3 times weekly for 4 weeks to include the following interventions: Cervical/Lumbar Traction, Electrical Stimulation , re-eval, dry needling, Gait Training, Manual Therapy, Moist Heat/ Ice, Neuromuscular Re-ed, Patient Education, Self Care, Therapeutic Activities, and Therapeutic Exercise.     Physical therapist and physical therapy assistant(s)  met face to face to discuss patient's treatment plan and progress towards established goals. Pt will be seen by a physical therapist minimally every 6th visit or every 30 days.    Upon discharge from further skilled PT intervention it will determined if the need for a work conditioning program or Functional Capacity Evaluation is required to allow the injured worker to return to work with full potential achieved, continued improvement with body mechanics with advanced functional activities, and further prevention of future work-related injuries.     Kathy Burroughs, PT  11/25/2022    I CERTIFY THE NEED FOR THESE SERVICES FURNISHED UNDER THIS PLAN OF TREATMENT AND WHILE UNDER MY CARE    Physician's comments:        Physician's Signature: ___________________________________________________

## 2022-11-29 ENCOUNTER — DOCUMENTATION ONLY (OUTPATIENT)
Dept: REHABILITATION | Facility: HOSPITAL | Age: 32
End: 2022-11-29
Payer: MEDICAID

## 2022-11-29 NOTE — PROGRESS NOTES
Pt was phoned regarding today's missed visit, but did not answer. A message was left to remind pt of next tx visit.   (11/1/22 at 1:45 pm). It is noted that this is her second cancellation since her initial evaluations.    Jacobo Lucero

## 2022-12-01 ENCOUNTER — CLINICAL SUPPORT (OUTPATIENT)
Dept: REHABILITATION | Facility: HOSPITAL | Age: 32
End: 2022-12-01
Payer: COMMERCIAL

## 2022-12-01 DIAGNOSIS — S66.912D STRAIN OF LEFT WRIST, SUBSEQUENT ENCOUNTER: Primary | ICD-10-CM

## 2022-12-01 PROCEDURE — 97140 MANUAL THERAPY 1/> REGIONS: CPT | Mod: PN,CQ

## 2022-12-01 PROCEDURE — 97110 THERAPEUTIC EXERCISES: CPT | Mod: PN,CQ

## 2022-12-01 NOTE — PROGRESS NOTES
"Workers' [employer] Physical Therapy Daily Treatment Note     Name: Odalis [facility]aeve [facility] Number: [id number]    Therapy Diagnosis:   Encounter Diagnosis   Name Primary?    Strain of left wrist, subsequent encounter Yes     Physician: Stefano Sherman DO    Visit Date: 12/1/2022    Physician Orders: PT Eval and Treat   Medical Diagnosis from Referral: S66.912D (ICD-10-CM) - Strain of left wrist, subsequent encounter   Evaluation Date: 11/25/2022  Authorization Period Expiration: 11/1/2023  Plan of Care Expiration: 01/25/2023  Visit # / Visits authorized: 2/12   FOTO: 1st tx  PTA: 1/5    (# of No Show Appts 0/Number of Cancelled Appts 0)    Time In: 2:05 pm  Time Out: 2:57 pm  Total Appointment Time (timed & untimed codes): 52 minutes    Precautions: Standard    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 Last worked in October of 2022. 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 Patient plans on returning to MD in about two weeks for follow up.      SUBJECTIVE     Pt reports: pain mostly at night when sleeping and putting pressure on her wrist or with heavy movements.   She was not compliant with home exercise program.  Response to previous treatment: increased pain L wrist ulnar side  Functional change: none    Pain: 0/10  Location:  n/a     OBJECTIVE     Objective Measures updated at progress report unless specified.     TREATMENT     Odalis received the treatments listed below:      therapeutic exercises to develop strength, endurance, ROM, flexibility, posture, and core stabilization for 32 minutes:  Patient education on nature of current condition and PHYSICAL THERAPY POC  Written HOME EXERCISE PROGRAM provided, reviewed, patient demo understanding      L forearm pronation/supination with hammer, 10" " "hold each way x 10    L wrist flex stretch, 20"x4   L wrist ext stretch, 20"x4   Flex bar 'rainbow' and 'smile' 15x with 5 sec hold  Repeated ulnar/radial deviation with R flex bar 1x 30" (c/o pain during last 5 seconds)  Ulnar/radial deviation with hammer 1x15 with 5 sec hold  L wrist flexion with 3#: 1x10 (may use less resistance next visit)  L wrist extension with 3#: 1x6  L  strength with towel, elbow flexed 10x with 5 sec hold    (Initiate) L wrist flex/ext, pro/sup, rad/ulnar dev isometrics, 5" hold, 2x10 each      manual therapy techniques: Joint mobilizations, Manual traction, Myofacial release, Soft tissue Mobilization, and Friction Massage for 20 minutes:  L wrist ext/flx stretch (ulnar side pain with end range during flexion)  L wrist ulnar/rad stretch (ulnar side joint pain with end range during radial deviation, & mostly ulnar deviation, decreases with movement)  L wrist supination stretch (slight pain in 3rd & 4th digit)  L wrist pronation stretch (most pain & tingling sensation in 3rd & 4th digit)  L forearm pronation/supination Gr III/IV JM   L wrist rad/ulnar dev, flex/ext Gr III/IV- JM    Ice massage to L wrist ulnar side (5 min)       therapeutic activities to improve functional performance for 00  minutes:  (Initiate) push/pull  (Initiate) overhand lift   (Initiate) underhand lift       PATIENT EDUCATION AND HOME EXERCISES      Home Exercises and Patient Education Provided:  - Pt was educated on all therapeutic exercises performed during today's treatment session.      Written Home Exercises Provided: Patient instructed to cont prior HEP. Exercises were reviewed and Odalis was able to demonstrate them prior to the end of the session.  Odalis demonstrated good  understanding of the education provided. See EMR under Patient Instructions for exercises provided during therapy sessions    ASSESSMENT     Odalis is a 32 y.o. female referred to outpatient Physical Therapy with a medical " diagnosis of S66.912D (ICD-10-CM) - Strain of left wrist, subsequent encounter. Pt presents with decreased ROM, muscle strength, flexibility, joint mobility. Increased pain, stiffness, soft tissue restriction. Impaired posture, joint mechanics.    Currently: Pt presents with increase in L arm supination compared to previous tx visit. It is also noted that pt displays no pain with L wrist extension. However, pt continues to exhibit L wrist pain on the ulnar side with all end-range movements including; flexion, radial/ulnar deviation, with ulnar deviation being the most pronounced during various exercises. Pt will benefit from performing activities promoting L wrist AROM/PROM in all directions in order to strengthen & improve wrist mobility. Will instruct pt to perform all movements in pain free range.       The patient's current job specific task deficits include the following: limitation in lifting, pushing, pulling, carrying and transferring patients.    Odalis Is progressing well towards her goals.   Pt prognosis is Good.     Pt will continue to benefit from skilled Physical Therapy interventions in order to address the deficits listed in the problem list box on initial evaluation, provide education, and to address the musculoskeletal limitations and work-related functional deficits for their job as a patient care tech.    Pt's spiritual, cultural and educational needs considered and pt agreeable to plan of care and goals.     Anticipated barriers to physical therapy:  none    Goals:   Short Term Goals: 2 weeks   (1) patient will be I with HOME EXERCISE PROGRAM (initial, not met)      (2) patient will obtain 71 deg L wrist flex ACTIVE RANGE OF MOTION to facilitate improved ability to assist patients with standing for showers at work (initial, not met)      (3) patient will 65 deg L wrist ext ACTIVE RANGE OF MOTION to indicate improved ability to style hair to get ready for work (initial, not met)      Long Term  Goals: 4 weeks   (1) patient will obtain 4/5 L forearm supination MMT to facilitate improved ability to use gait belts while assisting patients with pivot transfers from chair to bed at work (initial, not met)      (2) patient will obtain 35# L  strength using HHD with elbow flexed to facilitate improved safety and efficiency with pushing and pulling patients in wheelchairs at work (initial, not met)      (3) pt will return to work full duty, full time, according to previous level of function.     Plan     Plan of care Certification: 11/25/2022 to 01/25/2022.     Outpatient Physical Therapy 3 times weekly for 4 weeks to include the following interventions: Cervical/Lumbar Traction, Electrical Stimulation , re-eval, dry needling, Gait Training, Manual Therapy, Moist Heat/ Ice, Neuromuscular Re-ed, Patient Education, Self Care, Therapeutic Activities, and Therapeutic Exercise.      Physical therapist and physical therapy assistant(s) met face to face to discuss patient's treatment plan and progress towards established goals. Pt will be seen by a physical therapist minimally every 6th visit or every 30 days.     Upon discharge from further skilled PT intervention it will determined if the need for a work conditioning program or Functional Capacity Evaluation is required to allow the injured worker to return to work with full potential achieved, continued improvement with body mechanics with advanced functional activities, and further prevention of future work-related injuries.        Jacobo Lucero, PTA   12/1/2022

## 2022-12-08 ENCOUNTER — TELEPHONE (OUTPATIENT)
Dept: URGENT CARE | Facility: CLINIC | Age: 32
End: 2022-12-08
Payer: MEDICAID

## 2022-12-08 ENCOUNTER — DOCUMENTATION ONLY (OUTPATIENT)
Dept: REHABILITATION | Facility: HOSPITAL | Age: 32
End: 2022-12-08
Payer: MEDICAID

## 2022-12-08 DIAGNOSIS — S66.912D STRAIN OF LEFT WRIST, SUBSEQUENT ENCOUNTER: Primary | ICD-10-CM

## 2022-12-08 NOTE — PROGRESS NOTES
Outpatient Therapy Compliance Update     Name: Odalis Valadez  Clinic Number: [id number]    Therapy Diagnosis:   Encounter Diagnosis   Name Primary?    Strain of left wrist, subsequent encounter Yes     Physician: Stefano Sherman, DO    Care Update      Today, 12/08/2022, Odalis Valadez no-showed to her Physical Therapy appointment.   Odalis Valadez was contacted via phone call (left voicemail) regarding her missed appointment.     Date of next scheduled appointment: 12/12/2022    Kathy Burroughs, PT

## 2022-12-08 NOTE — TELEPHONE ENCOUNTER
Called patient and left a voice message and call back number regarding the missed [facility] health appointment.    AFG

## 2024-12-21 ENCOUNTER — HOSPITAL ENCOUNTER (EMERGENCY)
Facility: HOSPITAL | Age: 34
Discharge: HOME | End: 2024-12-21
Attending: EMERGENCY MEDICINE
Payer: COMMERCIAL

## 2024-12-21 VITALS
HEART RATE: 105 BPM | SYSTOLIC BLOOD PRESSURE: 142 MMHG | DIASTOLIC BLOOD PRESSURE: 91 MMHG | BODY MASS INDEX: 40.81 KG/M2 | WEIGHT: 260 LBS | HEIGHT: 67 IN | OXYGEN SATURATION: 100 % | RESPIRATION RATE: 18 BRPM | TEMPERATURE: 99 F

## 2024-12-21 DIAGNOSIS — J06.9 VIRAL URI WITH COUGH: Primary | ICD-10-CM

## 2024-12-21 LAB
B-HCG UR QL: NEGATIVE
CTP QC/QA: YES
POC MOLECULAR INFLUENZA A AGN: NEGATIVE
POC MOLECULAR INFLUENZA B AGN: NEGATIVE
SARS-COV-2 RDRP RESP QL NAA+PROBE: NEGATIVE

## 2024-12-21 PROCEDURE — 87502 INFLUENZA DNA AMP PROBE: CPT

## 2024-12-21 PROCEDURE — 96372 THER/PROPH/DIAG INJ SC/IM: CPT | Performed by: EMERGENCY MEDICINE

## 2024-12-21 PROCEDURE — 99284 EMERGENCY DEPT VISIT MOD MDM: CPT | Mod: 25

## 2024-12-21 PROCEDURE — 63600175 PHARM REV CODE 636 W HCPCS: Performed by: EMERGENCY MEDICINE

## 2024-12-21 PROCEDURE — 87635 SARS-COV-2 COVID-19 AMP PRB: CPT | Performed by: EMERGENCY MEDICINE

## 2024-12-21 PROCEDURE — 81025 URINE PREGNANCY TEST: CPT | Performed by: NURSE PRACTITIONER

## 2024-12-21 RX ORDER — ONDANSETRON 4 MG/1
4 TABLET, ORALLY DISINTEGRATING ORAL EVERY 6 HOURS PRN
Qty: 10 TABLET | Refills: 0 | Status: SHIPPED | OUTPATIENT
Start: 2024-12-21

## 2024-12-21 RX ORDER — BENZONATATE 100 MG/1
100 CAPSULE ORAL EVERY 8 HOURS PRN
Qty: 14 CAPSULE | Refills: 0 | Status: SHIPPED | OUTPATIENT
Start: 2024-12-21 | End: 2024-12-31

## 2024-12-21 RX ORDER — KETOROLAC TROMETHAMINE 30 MG/ML
30 INJECTION, SOLUTION INTRAMUSCULAR; INTRAVENOUS
Status: COMPLETED | OUTPATIENT
Start: 2024-12-21 | End: 2024-12-21

## 2024-12-21 RX ADMIN — KETOROLAC TROMETHAMINE 30 MG: 30 INJECTION, SOLUTION INTRAMUSCULAR; INTRAVENOUS at 08:12

## 2024-12-21 NOTE — Clinical Note
"Odalis Alicia"Rory was seen and treated in our emergency department on 12/21/2024.  She may return to work on 12/22/2024.       If you have any questions or concerns, please don't hesitate to call.       RN    "

## 2024-12-22 NOTE — ED PROVIDER NOTES
Encounter Date: 12/21/2024       History     Chief Complaint   Patient presents with    Influenza     FLU like s/s since today     34-year-old female presents emergency department complaining of cough, congestion, sore throat, body aches.  Onset today.  Denies any chest pain or shortness of breath.  Notes feeling hot and cold, unsure if she has had any fever.  Notes some occasional nausea without emesis.  Denies any constipation or diarrhea or dysuria frequency or urgency.  No other symptoms reported at this time.      Review of patient's allergies indicates:  No Known Allergies  History reviewed. No pertinent past medical history.  History reviewed. No pertinent surgical history.  Family History   Problem Relation Name Age of Onset    Hypertension Mother      Stroke Father       Social History     Tobacco Use    Smoking status: Never    Smokeless tobacco: Never   Substance Use Topics    Alcohol use: No    Drug use: Never     Review of Systems   Constitutional:  Negative for chills.   HENT:  Positive for congestion.    Respiratory:  Positive for cough. Negative for shortness of breath.    Cardiovascular:  Negative for chest pain.   Gastrointestinal:  Negative for abdominal pain.   Musculoskeletal:  Negative for back pain.   Neurological:  Negative for headaches.       Physical Exam     Initial Vitals [12/21/24 1938]   BP Pulse Resp Temp SpO2   (!) 142/91 105 18 98.7 °F (37.1 °C) 100 %      MAP       --         Physical Exam    Nursing note and vitals reviewed.  Constitutional: She appears well-developed and well-nourished. No distress.   HENT:   Head: Normocephalic and atraumatic.   Marizol pharyngeal erythema without swelling or exudates   Eyes: Conjunctivae and EOM are normal. Pupils are equal, round, and reactive to light.   Neck: Neck supple. No tracheal deviation present.   Normal range of motion.  Cardiovascular:  Normal rate and intact distal pulses.           Pulmonary/Chest: No respiratory distress.    Musculoskeletal:         General: No tenderness. Normal range of motion.      Cervical back: Normal range of motion and neck supple.     Neurological: She is alert and oriented to person, place, and time. She has normal strength. No cranial nerve deficit. GCS score is 15. GCS eye subscore is 4. GCS verbal subscore is 5. GCS motor subscore is 6.   Skin: Skin is warm and dry.         ED Course   Procedures  Labs Reviewed   POCT URINE PREGNANCY       Result Value    POC Preg Test, Ur Negative       Acceptable Yes     SARS-COV-2 RDRP GENE    POC Rapid COVID Negative       Acceptable Yes     POCT INFLUENZA A/B MOLECULAR    POC Molecular Influenza A Ag Negative      POC Molecular Influenza B Ag Negative       Acceptable Yes            Imaging Results    None          Medications   ketorolac injection 30 mg (30 mg Intramuscular Given 12/21/24 2016)     Medical Decision Making  34-year-old female presents emergency department complaining of cough, congestion, sore throat, body aches      Differential: URI, COVID, influenza, viral syndrome      Patient given IM Toradol.  Informed of results as well as plan to discharge with prescriptions for Zofran and Tessalon, instructed on home management, over-the-counter medications, follow up with primary care physician, strict return precautions given.  Vital signs stable.    Problems Addressed:  Viral URI with cough: acute illness or injury    Amount and/or Complexity of Data Reviewed  External Data Reviewed: notes.     Details: Reviewed most recent OB note documenting baseline medications and past medical history  Labs: ordered.     Details: UPT, COVID, influenza negative    Risk  OTC drugs.  Prescription drug management.  Parenteral controlled substances.                                      Clinical Impression:  Final diagnoses:  [J06.9] Viral URI with cough (Primary)          ED Disposition Condition    Discharge Stable          ED  Prescriptions       Medication Sig Dispense Start Date End Date Auth. Provider    ondansetron (ZOFRAN-ODT) 4 MG TbDL Take 1 tablet (4 mg total) by mouth every 6 (six) hours as needed (Nausea). 10 tablet 12/21/2024 -- Lefty Palomino MD    benzonatate (TESSALON) 100 MG capsule Take 1 capsule (100 mg total) by mouth every 8 (eight) hours as needed for Cough. 14 capsule 12/21/2024 12/31/2024 Lefty Palomino MD          Follow-up Information       Follow up With Specialties Details Why Contact Info    Your primary care physician  Schedule an appointment as soon as possible for a visit                Lefty Palomino MD  12/21/24 2052

## 2024-12-22 NOTE — FIRST PROVIDER EVALUATION
Emergency Department TeleTriage Encounter Note      CHIEF COMPLAINT    Chief Complaint   Patient presents with    Influenza     FLU like s/s since today       VITAL SIGNS   Initial Vitals [12/21/24 1938]   BP Pulse Resp Temp SpO2   (!) 142/91 105 18 98.7 °F (37.1 °C) 100 %      MAP       --            ALLERGIES    Review of patient's allergies indicates:  No Known Allergies    PROVIDER TRIAGE NOTE  Verbal consent for the teletriage evaluation was given by the patient at the start of the evaluation.  All efforts will be made to maintain patient's privacy during the evaluation.      This is a teletriage evaluation of a 34 y.o. female presenting to the ED with c/o congestion, cough, body aches that started today. Limited physical exam via telehealth: The patient is awake, alert, answering questions appropriately and is not in respiratory distress.  As the Teletriage provider, I performed an initial assessment and ordered appropriate labs and imaging studies, if any, to facilitate the patient's care once placed in the ED. Once a room is available, care and a full evaluation will be completed by an alternate ED provider.  Any additional orders and the final disposition will be determined by that provider.  All imaging and labs will not be followed-up by the Teletriage Team, including myself.          ORDERS  Labs Reviewed - No data to display    ED Orders (720h ago, onward)      Start Ordered     Status Ordering Provider    Unscheduled 12/21/24 1946  POCT urine pregnancy  Once         Ordered JAYNE MCKEON A              Virtual Visit Note: The provider triage portion of this emergency department evaluation and documentation was performed via [employer], a HIPAA-compliant telemedicine application, in concert with a tele-presenter in the room. A face to face patient evaluation with one of my colleagues will occur once the patient is placed in an emergency department room.      DISCLAIMER: This note was prepared with  M*[employer] voice recognition transcription software. Garbled syntax, mangled pronouns, and other bizarre constructions may be attributed to that software system.

## 2025-06-25 ENCOUNTER — HOSPITAL ENCOUNTER (EMERGENCY)
Facility: HOSPITAL | Age: 35
Discharge: HOME | End: 2025-06-25
Attending: STUDENT IN AN ORGANIZED HEALTH CARE EDUCATION/TRAINING PROGRAM
Payer: MEDICAID

## 2025-06-25 VITALS
BODY MASS INDEX: 44.41 KG/M2 | HEIGHT: 68 IN | DIASTOLIC BLOOD PRESSURE: 68 MMHG | RESPIRATION RATE: 18 BRPM | SYSTOLIC BLOOD PRESSURE: 130 MMHG | TEMPERATURE: 99 F | OXYGEN SATURATION: 100 % | WEIGHT: 293 LBS | HEART RATE: 88 BPM

## 2025-06-25 DIAGNOSIS — R49.0 HOARSENESS, PERSISTENT: Primary | ICD-10-CM

## 2025-06-25 PROCEDURE — 99281 EMR DPT VST MAYX REQ PHY/QHP: CPT

## 2025-06-25 NOTE — ED TRIAGE NOTES
Pt reports hoarseness to her voice for the past two months. She denies any pain or sore throat..She denies recent cough or cold and she denies smoking. She states that she woke up one morning two months ago and she was like this. She has seen her Dr for this who was unsure what the cause was.

## 2025-06-25 NOTE — DISCHARGE INSTRUCTIONS
Attached is further explanation on what we discussed.  You will formally need to be seen by an ENT to diagnose this.

## 2025-06-25 NOTE — ED PROVIDER NOTES
NAME:  Odalis Valadez  CSN:     [id number]  MRN:    [id number]  ADMIT DATE: 6/25/2025        eMERGENCY dEPARTMENT eNCOUnter    CHIEF COMPLAINT    Chief Complaint   Patient presents with    Hoarse     Reports hoarse voice x2 months. Denies pain, cough.        HPI    Odalis Valadez is a 34 y.o. female with a past medical history of  has no past medical history on file.     she presents to the ED due to 2 months of persistent hoarse voice.  Feels that has improved somewhat but has not completely resolved.  She states that occurred shortly after her house was sprayed by term mites in his monitoring it is this could have been the cause.  She denies any throat pain.  She denies any recent illness.  No difficulty or pain with swallowing.  She does not saying.  No head or neck surgeries.  No known medical problems.  No symptoms of indigestion or acid reflux.      HPI       PAST MEDICAL HISTORY  History reviewed. No pertinent past medical history.    SURGICAL HISTORY    History reviewed. No pertinent surgical history.    FAMILY HISTORY    Family History   Problem Relation Name Age of Onset    Hypertension Mother      Stroke Father         SOCIAL HISTORY    Social History     Socioeconomic History    Marital status:    Tobacco Use    Smoking status: Never    Smokeless tobacco: Never   Substance and Sexual Activity    Alcohol use: No    Drug use: Never    Sexual activity: Yes     Birth control/protection: Condom       MEDICATIONS  Current Outpatient Medications   Medication Instructions    ondansetron (ZOFRAN-ODT) 4 mg, Oral, Every 6 hours PRN       ALLERGIES    Review of patient's allergies indicates:  No Known Allergies      REVIEW OF SYSTEMS   Review of Systems       PHYSICAL EXAM    Reviewed Triage Note    VITAL SIGNS:   ED Triage Vitals [06/25/25 1126]   Encounter Vitals Group      /68      Girls Systolic BP Percentile       Girls Diastolic BP Percentile       Boys Systolic BP Percentile        "Boys Diastolic BP Percentile       Pulse 88      Resp 18      Temp 98.5 °F (36.9 °C)      Temp Source Oral      SpO2 100 %      Weight 297 lb      Height 5' 8"      Head Circumference       Peak Flow       Pain Score       Pain Loc       Pain Education       Exclude from Growth Chart        Patient Vitals for the past 24 hrs:   BP Temp Temp src Pulse Resp SpO2 Height Weight   06/25/25 1126 130/68 98.5 °F (36.9 °C) Oral 88 18 100 % 5' 8" (1.727 m) 134.7 kg (297 lb)       Physical Exam    Nursing note and vitals reviewed.  Constitutional: She appears well-developed and well-nourished.   HENT:   Head: Normocephalic and atraumatic. Mouth/Throat: Uvula is midline, oropharynx is clear and moist and mucous membranes are normal. No oral lesions. No trismus in the jaw. No uvula swelling. No oropharyngeal exudate, posterior oropharyngeal edema, posterior oropharyngeal erythema or tonsillar abscesses.   Voice is hoarse, not muffled.  Tolerating secretions   Neck: Trachea normal. Neck supple. No thyroid mass and no thyromegaly present. No stridor present.   Normal range of motion.  Cardiovascular:  Normal rate.           Pulmonary/Chest: No respiratory distress.   Musculoskeletal:      Cervical back: Normal range of motion and neck supple.     Lymphadenopathy:        Right cervical: No superficial cervical, no deep cervical and no posterior cervical adenopathy present.       Left cervical: No superficial cervical, no deep cervical and no posterior cervical adenopathy present.   Neurological: She is alert and oriented to person, place, and time.   Skin: Skin is warm and dry.              EKG     Interpreted by EM physician if performed:               LABS  Pertinent labs reviewed. (See chart for details)   Labs Reviewed - No data to display      RADIOLOGY          Imaging Results    None           PROCEDURES    Procedures      ED COURSE & MEDICAL DECISION MAKING    Pertinent Labs & Imaging studies reviewed. (See chart for " details and specific orders.)          Summary of review of records:       Medical Decision Making    Odalis Valadez is a 34 y.o. female who presents with 2 months of persistent hoarseness of her voice.  No associated signs or symptoms.  No recent illness.  No difficulty swallowing.  No reflux symptoms.    Differential includes but is not limited to vocal cord dysfunction, no evidence of thyromegaly.  No lymphadenopathy within the neck.  Tolerating secretions no tonsillar edema or exudate.            Medications - No data to display         Advise continued care with lozenges, hot tea honey, vocal cord rest.  ENT referral placed as she will need formal laryngoscope done for diagnostic purposes.      FINAL IMPRESSION    Final diagnoses:  [R49.0] Hoarseness, persistent (Primary)       DISPOSITION  Patient discharge in stable condition        ED Prescriptions    None       Follow-up Information       Follow up With Specialties Details Why Contact Info    Arelis Cody MD Otolaryngology Schedule an appointment as soon as possible for a visit   [address] [facility]  [address]  [facility] [address]  [phone]                DISCLAIMER: This note was prepared with [employer] voice recognition transcription software. Garbled syntax, mangled pronouns, and other bizarre constructions may be attributed to that software system.             Adore Rivero, DO  06/25/25 4755

## 2025-06-25 NOTE — Clinical Note
"Odalis Chatmanjada Valadez was seen and treated in our emergency department on 6/25/2025.  She may return to work on 06/26/2025.       If you have any questions or concerns, please don't hesitate to call.      Kishan Moreno LPN    "

## 2025-07-16 ENCOUNTER — OFFICE VISIT (OUTPATIENT)
Dept: OTOLARYNGOLOGY | Facility: CLINIC | Age: 35
End: 2025-07-16
Payer: MEDICAID

## 2025-07-16 VITALS — SYSTOLIC BLOOD PRESSURE: 119 MMHG | HEART RATE: 87 BPM | DIASTOLIC BLOOD PRESSURE: 78 MMHG

## 2025-07-16 DIAGNOSIS — R49.0 HOARSENESS, PERSISTENT: ICD-10-CM

## 2025-07-16 DIAGNOSIS — J38.2 VOCAL NODULES IN ADULTS: Primary | ICD-10-CM

## 2025-07-16 PROCEDURE — 99213 OFFICE O/P EST LOW 20 MIN: CPT | Mod: PBBFAC | Performed by: PHYSICIAN ASSISTANT

## 2025-07-16 PROCEDURE — 31579 LARYNGOSCOPY TELESCOPIC: CPT | Mod: PBBFAC | Performed by: PHYSICIAN ASSISTANT

## 2025-07-16 PROCEDURE — 99999 PR PBB SHADOW E&M-EST. PATIENT-LVL III: CPT | Mod: PBBFAC,,, | Performed by: PHYSICIAN ASSISTANT

## 2025-07-16 RX ORDER — ERGOCALCIFEROL 1.25 MG/1
50000 CAPSULE ORAL
COMMUNITY
Start: 2025-06-03

## 2025-07-16 RX ORDER — CETIRIZINE HYDROCHLORIDE 10 MG/1
10 TABLET ORAL DAILY
COMMUNITY

## 2025-07-16 NOTE — PROGRESS NOTES
Ear, Nose, & Throat  Otolaryngology - Head & Neck Surgery    Summary of Visit:  Odalis Valadez was referred to me by Dr. Adore Rivero in consultation for Raspy voice/loss of voice/hoarse      Subjective:     Chief Complaint:   Chief Complaint   Patient presents with    Raspy voice/loss of voice/hoarse       Odalis Valadez is a 35 y.o. female who was referred to me by Dr. Adore Rivero in consultation for 3 months history of hoarseness. Her voice is not progressively worsening over this time, it has improved since onset. There are pitch breaks or cracks worse in the mornings. She feels her voice is rough/deep. There is vocal fatigue. Around onset of this she had sore throat, nasal congestion, chills/aches and was taking Nyquil/Dayquil but hoarseness remained. She also states this occurred shortly after her house was sprayed for term mites. 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 She denies dysphagia, odynophagia, throat pain, globus sensation and otalgia. There is no hemoptysis or hematemesis. She is breathing well. She denies throat clearing. She has an occasional cough. She denies heartburn and reflux symptoms.     Occasional cough triggers:  - voice use:  no  - breathing heavily:  no  - laughing:  no  - eating:  no  - drinking cold liquids:  yes  - strong odors:  no  - post-prandial:  no  - lying down:  no    Tobacco: none  Etoh: none    Past Medical History  No past medical history on file.    Past Surgical History  No past surgical history on file.    No past surgical history on file.     Family History  Family History   Problem Relation Name Age of Onset    Hypertension Mother      Stroke Father         Social History  Social History     Socioeconomic History    Marital status:    Tobacco Use    Smoking status: Never    Smokeless tobacco: Never   Substance and Sexual Activity    Alcohol use: No    Drug use: Never    Sexual activity: Yes     Birth control/protection: Condom        Allergies  Patient has no known allergies.    Medications  Current Medications[1]    ROS:  Pertinent positive and negative review of systems as noted in HPI.     Objective:     /78 (BP Location: Right arm, Patient Position: Sitting)   Pulse 87      Physical Exam    General Appearance:   Awake, Alert and Oriented. NAD. Appropriate affect and appearance      Neuro:   Spontaneous eye opening, appropriate verbal responses, follows commands  Pupils equal, round & brisk. EOMI, no proptosis  Face is symmetric, non-edematous bilaterally     Head and Face:   skin is intact with no lesions noted.  Parotid and submandibular glands are symmetric and non-tender.     Nose:   External nose is symmetric, no skin lesions  Inferior turbinate hypertrophy, No polyps or rhinorrhea     OC/OP:  Tongue midline on extension, non-edematous, soft  No labial, buccal, oral tongue or floor of mouth lesions  Soft palate symmetric, midline and without lesions or masses, tonsils symmetric,  No masses or lesions of the visualized oropharynx     Neck:  Neck is symmetric, non-edematous, non-erythematous  Trachea is midline  No thyromegaly, thyroid nodules or masses, non-tender   No palpable adenopathy or masses in levels I-VI     Respiratory:  Normal work of breathing, no accessory muscle use, no stridor    Voice:    Quality: mildly rough   Volume: appropriate for age and gender   Pitch: appropriate for age and gender   Flexibility: pitch breaks     Data Review:       Procedures:   Flexible Laryngeal Videostroboscopy (05351): Laryngeal videostroboscopy is indicated to assess laryngeal vibratory biomechanics and vocal fold oscillation, which cannot be assessed with a plain light examination. This was carried out transnasally with a distal chip videoendoscope. After verbal consent was obtained, the patient was positioned and the nose was topically decongested with 1% phenylephrine and topically anesthetized with 4% lidocaine. The endoscope  was passed through the most patent nasal cavity and positioned to image the nasopharynx, larynx, and hypopharynx in detail. The following features were examined: nasopharyngeal, laryngeal, hypopharyngeal masses; velopharyngeal strength, closure, and symmetry of motion; vocal fold range and symmetry of motion; laryngeal mucosal edema, erythema, inflammation, and hydration; salivary pooling; and gross laryngeal sensation. During phonation, the vocal folds were assessed for glottal closure; mucosal wave; vocal fold lesions; vibratory periodicity, amplitude, and phase symmetry; and vertical height match. The equipment was removed. The patient tolerated the procedure well without complication. All findings were normal except:    Nasopharynx, base of tongue without masses or lesions    True vocal folds:  Mobility: no motion impairment  Closure: gap closure   Mucosal wave: present though decreased amplitude and phase asymmetry  There is prominent edema of right TVF compared to left and nodular changes of the right and left at the middle 1/3. Thick mucus secretions also present    Exam reviewed with Dr. Mohamud and in agreement with findings.    Assessment and Plan:     1. Vocal nodules in adults  -     Ambulatory referral/consult to Speech Therapy    2. Hoarseness, persistent  -     Ambulatory referral/consult to ENT      I had a long discussion with the patient regarding her condition and the further workup and management options. Recommend voice therapy. Explained her condition is commonly caused by trauma to the vocal folds, voice therapy works to reduce the amount of high-impact contact of the vocal folds, as well as to improve overall voice hygiene and maintenance. Also recommended voice rest when possible and hydration via inhaling steam and drinking water. Questions were encouraged and answered. The patient understands to return sooner for any new symptoms/concerns/worsening prior to any scheduled visits. She will  follow up in 3-4 months following voice therapy.      Problem List Items Addressed This Visit    None     Answers submitted by the patient for this visit:  Review of Symptoms Questionnaire  (Submitted on 7/15/2025)  Fatigue (Tiredness)?: Yes  None of these: Yes  None of these : Yes  None of these: Yes  None of these : Yes  None of these: Yes  None of these: Yes  None of these : Yes  Seasonal Allergies?: Yes  Cold all of the time? : Yes  None of these: Yes  None of these: Yes  sleep disturbance: Yes         [1]   Current Outpatient Medications   Medication Sig Dispense Refill    ondansetron (ZOFRAN-ODT) 4 MG TbDL Take 1 tablet (4 mg total) by mouth every 6 (six) hours as needed (Nausea). 10 tablet 0     No current facility-administered medications for this visit.

## 2025-07-16 NOTE — PATIENT INSTRUCTIONS
BENIGN VOCAL FOLD LESIONS     What are benign vocal fold lesions?    Benign vocal fold lesions are non-cancerous growths that may or may not affect voice quality.     Nodules and polyps are common growths that develop at the middle of the vocal folds. Mature nodules are similar to calluses within the vocal fold tissues, usually on both sides. Polyps tend to be more fluid-filled than nodules and may have visible blood vessels feeding into them. Polyps can be on one or both sides. Typically, symptoms for both lesions include hoarseness, effortful voice, and rapid vocal fatigue.    Both nodules and polyps are most commonly caused by vocal fold trauma during voice use (talking or singing). The middle of the vocal folds receives the greatest impact during phonation, which is why nodules and polyps are most likely to develop there.  Smoking, alcohol use, caffeine intake, drying medications, allergies, exposure to chemicals, and reflux may also increase the likelihood that nodules or polyps will develop.    Cysts in the vocal folds are fluid-filled sacs surrounded by a layer of skin. A cyst typically forms in the middle of one vocal cord and causes reactive swelling on the other. Cysts are also likely caused by the forceful contact of the vocal folds.     How are they treated?    For nodules, voice therapy is the first line of treatment. Since nodules are commonly caused by trauma to the vocal folds, voice therapy works to reduce the amount of high-impact contact of the vocal folds, as well as to improve overall voice hygiene and maintenance. In some cases, voice therapy improves voice quality--but does not resolve the problem completely. In these cases, patients may discuss the possibility of microsurgery with their laryngologist.    Like with nodules, voice therapy may be the first line of treatment for polyps. However, in some cases, surgical removal of the polyp is recommended first, followed by a course  of voice therapy to ensure continued healthy, efficient voice use.    Cysts, unlike other types of benign lesions, do not go away with voice therapy alone. However, voice therapy is still a part of the treatment for 1) reduction of vocal fold swelling which will likely improve symptoms, and 2) differential diagnosis: if a lesion goes away with voice therapy, it was most likely not a cyst; if it does go away, it may be a cyst, and surgery may be appropriate.

## 2025-07-17 ENCOUNTER — PATIENT MESSAGE (OUTPATIENT)
Dept: SPEECH THERAPY | Facility: HOSPITAL | Age: 35
End: 2025-07-17
Payer: MEDICAID